# Patient Record
Sex: FEMALE | Race: BLACK OR AFRICAN AMERICAN | Employment: OTHER | URBAN - METROPOLITAN AREA
[De-identification: names, ages, dates, MRNs, and addresses within clinical notes are randomized per-mention and may not be internally consistent; named-entity substitution may affect disease eponyms.]

---

## 2018-05-04 ENCOUNTER — HOSPITAL ENCOUNTER (OUTPATIENT)
Dept: LAB | Age: 68
Discharge: HOME | End: 2018-05-04
Payer: MEDICARE

## 2018-05-04 ENCOUNTER — TELEPHONE (OUTPATIENT)
Dept: FAMILY MEDICINE CLINIC | Age: 68
End: 2018-05-04

## 2018-05-04 ENCOUNTER — OFFICE VISIT (OUTPATIENT)
Dept: FAMILY MEDICINE CLINIC | Age: 68
End: 2018-05-04

## 2018-05-04 VITALS
DIASTOLIC BLOOD PRESSURE: 67 MMHG | BODY MASS INDEX: 50.02 KG/M2 | WEIGHT: 293 LBS | RESPIRATION RATE: 20 BRPM | OXYGEN SATURATION: 98 % | SYSTOLIC BLOOD PRESSURE: 129 MMHG | HEART RATE: 80 BPM | HEIGHT: 64 IN | TEMPERATURE: 97.8 F

## 2018-05-04 DIAGNOSIS — J44.9 COPD, MODERATE (HCC): ICD-10-CM

## 2018-05-04 DIAGNOSIS — I10 ESSENTIAL HYPERTENSION: ICD-10-CM

## 2018-05-04 DIAGNOSIS — J30.89 ALLERGIC RHINITIS DUE TO OTHER ALLERGIC TRIGGER, UNSPECIFIED SEASONALITY: ICD-10-CM

## 2018-05-04 DIAGNOSIS — Z12.39 BREAST CANCER SCREENING: ICD-10-CM

## 2018-05-04 DIAGNOSIS — E66.01 MORBID OBESITY WITH BODY MASS INDEX OF 60.0-69.9 IN ADULT (HCC): ICD-10-CM

## 2018-05-04 DIAGNOSIS — N32.81 OVERACTIVE BLADDER: ICD-10-CM

## 2018-05-04 DIAGNOSIS — R01.1 SYSTOLIC MURMUR: ICD-10-CM

## 2018-05-04 DIAGNOSIS — E78.2 MIXED HYPERLIPIDEMIA: ICD-10-CM

## 2018-05-04 DIAGNOSIS — G47.33 OSA ON CPAP: ICD-10-CM

## 2018-05-04 DIAGNOSIS — E11.9 CONTROLLED TYPE 2 DIABETES MELLITUS WITHOUT COMPLICATION, WITHOUT LONG-TERM CURRENT USE OF INSULIN (HCC): ICD-10-CM

## 2018-05-04 DIAGNOSIS — Z11.59 NEED FOR HEPATITIS C SCREENING TEST: ICD-10-CM

## 2018-05-04 DIAGNOSIS — Z87.19 HISTORY OF RECTAL BLEEDING: Primary | ICD-10-CM

## 2018-05-04 DIAGNOSIS — Z99.89 OSA ON CPAP: ICD-10-CM

## 2018-05-04 DIAGNOSIS — F33.1 MDD (MAJOR DEPRESSIVE DISORDER), RECURRENT EPISODE, MODERATE (HCC): ICD-10-CM

## 2018-05-04 DIAGNOSIS — Z76.89 ESTABLISHING CARE WITH NEW DOCTOR, ENCOUNTER FOR: ICD-10-CM

## 2018-05-04 DIAGNOSIS — N39.46 MIXED INCONTINENCE URGE AND STRESS: ICD-10-CM

## 2018-05-04 PROBLEM — Z86.73 HISTORY OF CVA (CEREBROVASCULAR ACCIDENT): Status: ACTIVE | Noted: 2018-05-04

## 2018-05-04 LAB
ALBUMIN SERPL-MCNC: 3.4 G/DL (ref 3.4–5)
ALBUMIN/GLOB SERPL: 0.9 {RATIO} (ref 0.8–1.7)
ALP SERPL-CCNC: 98 U/L (ref 45–117)
ALT SERPL-CCNC: 15 U/L (ref 13–56)
ANION GAP SERPL CALC-SCNC: 11 MMOL/L (ref 3–18)
AST SERPL-CCNC: 8 U/L (ref 15–37)
BASOPHILS # BLD: 0 K/UL (ref 0–0.06)
BASOPHILS NFR BLD: 0 % (ref 0–2)
BILIRUB SERPL-MCNC: 0.3 MG/DL (ref 0.2–1)
BUN SERPL-MCNC: 27 MG/DL (ref 7–18)
BUN/CREAT SERPL: 26 (ref 12–20)
CALCIUM SERPL-MCNC: 8.4 MG/DL (ref 8.5–10.1)
CHLORIDE SERPL-SCNC: 107 MMOL/L (ref 100–108)
CHOLEST SERPL-MCNC: 171 MG/DL
CO2 SERPL-SCNC: 21 MMOL/L (ref 21–32)
CREAT SERPL-MCNC: 1.05 MG/DL (ref 0.6–1.3)
DIFFERENTIAL METHOD BLD: ABNORMAL
EOSINOPHIL # BLD: 0.3 K/UL (ref 0–0.4)
EOSINOPHIL NFR BLD: 4 % (ref 0–5)
ERYTHROCYTE [DISTWIDTH] IN BLOOD BY AUTOMATED COUNT: 14.3 % (ref 11.6–14.5)
EST. AVERAGE GLUCOSE BLD GHB EST-MCNC: 114 MG/DL
GLOBULIN SER CALC-MCNC: 3.9 G/DL (ref 2–4)
GLUCOSE SERPL-MCNC: 71 MG/DL (ref 74–99)
HBA1C MFR BLD: 5.6 % (ref 4.2–5.6)
HCT VFR BLD AUTO: 39.5 % (ref 35–45)
HDLC SERPL-MCNC: 67 MG/DL (ref 40–60)
HDLC SERPL: 2.6 {RATIO} (ref 0–5)
HGB BLD-MCNC: 12.5 G/DL (ref 12–16)
LDLC SERPL CALC-MCNC: 84.8 MG/DL (ref 0–100)
LIPID PROFILE,FLP: ABNORMAL
LYMPHOCYTES # BLD: 3.2 K/UL (ref 0.9–3.6)
LYMPHOCYTES NFR BLD: 34 % (ref 21–52)
MCH RBC QN AUTO: 31.8 PG (ref 24–34)
MCHC RBC AUTO-ENTMCNC: 31.6 G/DL (ref 31–37)
MCV RBC AUTO: 100.5 FL (ref 74–97)
MONOCYTES # BLD: 0.6 K/UL (ref 0.05–1.2)
MONOCYTES NFR BLD: 6 % (ref 3–10)
NEUTS SEG # BLD: 5.2 K/UL (ref 1.8–8)
NEUTS SEG NFR BLD: 56 % (ref 40–73)
PLATELET # BLD AUTO: 316 K/UL (ref 135–420)
PMV BLD AUTO: 10 FL (ref 9.2–11.8)
POTASSIUM SERPL-SCNC: 4.1 MMOL/L (ref 3.5–5.5)
PROT SERPL-MCNC: 7.3 G/DL (ref 6.4–8.2)
RBC # BLD AUTO: 3.93 M/UL (ref 4.2–5.3)
SODIUM SERPL-SCNC: 139 MMOL/L (ref 136–145)
TRIGL SERPL-MCNC: 96 MG/DL (ref ?–150)
TSH SERPL DL<=0.05 MIU/L-ACNC: 1.14 UIU/ML (ref 0.36–3.74)
VLDLC SERPL CALC-MCNC: 19.2 MG/DL
WBC # BLD AUTO: 9.2 K/UL (ref 4.6–13.2)

## 2018-05-04 PROCEDURE — 86803 HEPATITIS C AB TEST: CPT | Performed by: INTERNAL MEDICINE

## 2018-05-04 PROCEDURE — 80053 COMPREHEN METABOLIC PANEL: CPT | Performed by: INTERNAL MEDICINE

## 2018-05-04 PROCEDURE — 85025 COMPLETE CBC W/AUTO DIFF WBC: CPT | Performed by: INTERNAL MEDICINE

## 2018-05-04 PROCEDURE — 83036 HEMOGLOBIN GLYCOSYLATED A1C: CPT | Performed by: INTERNAL MEDICINE

## 2018-05-04 PROCEDURE — 84443 ASSAY THYROID STIM HORMONE: CPT | Performed by: INTERNAL MEDICINE

## 2018-05-04 PROCEDURE — 80061 LIPID PANEL: CPT | Performed by: INTERNAL MEDICINE

## 2018-05-04 RX ORDER — FLUTICASONE PROPIONATE 50 MCG
2 SPRAY, SUSPENSION (ML) NASAL DAILY
Qty: 1 BOTTLE | Refills: 6 | Status: SHIPPED | OUTPATIENT
Start: 2018-05-04 | End: 2019-04-01 | Stop reason: SDUPTHER

## 2018-05-04 RX ORDER — FLUTICASONE PROPIONATE 50 MCG
2 SPRAY, SUSPENSION (ML) NASAL DAILY
COMMUNITY
End: 2018-05-04 | Stop reason: SDUPTHER

## 2018-05-04 RX ORDER — CITALOPRAM 20 MG/1
20 TABLET, FILM COATED ORAL DAILY
Qty: 30 TAB | Refills: 1 | Status: SHIPPED | OUTPATIENT
Start: 2018-05-04 | End: 2018-06-30 | Stop reason: SDUPTHER

## 2018-05-04 NOTE — PROGRESS NOTES
INTERNAL MEDICINE INITIAL PROVIDER VISIT    SUBJECTIVE:     Chief Complaint   Patient presents with    Establish Care    Diabetes       HPI: 74 y.o. female with PMHx significant for Morbid obesity, depression,  DM, HTN, DARCI/COPD and h/o CVA is here for the above chief complaint(s). Establish Care: Change in coverage 2017. Last PCP Matthew Schwartz, records reviewed. Last blood work 10/2017. Reports she needs refill of celexa and flonase, other medicines not needed currently. Request order for mammogram as she is due for breast cancer screening and referral to GI for colonoscopy, planned 2017 before insurance change for evaluation of rectal bleeding. No longer having rectal bleeding. Sleep Apnea/COPD: Followed by [facility] [place]. YORDAN signed, plans to schedule follow up today. Needs new part for CPAP machines, hasn't used in past few months. Using breo-ellipta and spiriva, occasional as needed albuterol. Taking Singulair for allergies. Diabetes: Taking metformin 500 mg BID. Last A1c per outside records 6.0 2017. Last foot exam 2017. Needs eye referral. Last nephropathy screen  and was negative. Depression: Last dose of celexa last night 20 mg. Does not know if helpful. For past 2 weeks reports nearly daily anhedonia, low energy, feeling down, trouble sleeping, appetite changes. No SI/HI. Writer viewed pill bottle,  in . Will refill celexa, suspect lack of benefit from taking  medications. Will follow. Overactive bladder/mixed urinary incontinence: Using vesicare, helpful. Followed by urology. Hypertension: Today BP is controlled. Taking lisinopril HCTZ 10-12.5 last dose yesterday, plans to take tomorrow. Helps with leg swelling. . Denies headache, lightheadedness, dizziness, chest pain, rapid/irregular heart rate, shortness of breath currently. Some mild leg swelling.         ROS: 12 point ROS completed, positive per HPI and memory troubles, joint pains.     Current Outpatient Prescriptions   Medication Sig    fluticasone (FLONASE ALLERGY RELIEF) 50 mcg/actuation nasal spray 2 Sprays by Both Nostrils route daily.  citalopram (CELEXA) 20 mg tablet Take 1 Tab by mouth daily.  solifenacin (VESICARE) 5 mg tablet Take 1 Tab by mouth daily. Indications: URINARY URGE INCONTINENCE    BREO ELLIPTA 100-25 mcg/dose inhaler     atorvastatin (LIPITOR) 20 mg tablet TAKE ONE TABLET BY MOUTH EVERY NIGHT AT BEDTIME    lisinopril-hydroCHLOROthiazide (PRINZIDE, ZESTORETIC) 10-12.5 mg per tablet TAKE 1 TABLET BY MOUTH EVERY DAY    metFORMIN (GLUCOPHAGE) 500 mg tablet TAKE ONE TABLET BY MOUTH TWO TIMES A DAY    montelukast (SINGULAIR) 10 mg tablet TAKE ONE TABLET BY MOUTH EVERY NIGHT AT BEDTIME    SPIRIVA WITH HANDIHALER 18 mcg inhalation capsule INHALE 1 CAPSULE VIA HANDIHALER ONCE DAILY AT THE SAME TIME EVERY DAY    albuterol (PROVENTIL HFA, VENTOLIN HFA, PROAIR HFA) 90 mcg/actuation inhaler Take  by inhalation.  Menthol-Zinc Oxide (CALMOSEPTINE) 0.44-20.6 % oint Apply  to affected area.  acetaminophen (TYLENOL) 650 mg CR tablet Take 650 mg by mouth every six (6) hours as needed for Pain.  ASPIRIN LOW-STRENGTH PO Take  by mouth. No current facility-administered medications for this visit.       Health Maintenance   Topic Date Due    Hepatitis C Screening  1950    HEMOGLOBIN A1C Q6M  1950    LIPID PANEL Q1  1950    FOOT EXAM Q1  07/05/1962    EYE EXAM RETINAL OR DILATED Q1  07/05/1962    FOBT Q 1 YEAR AGE 50-75  07/05/2002    ZOSTER VACCINE AGE 60>  05/05/2012    BREAST CANCER SCRN MAMMOGRAM  06/02/2016    MICROALBUMIN Q1  06/02/2017    GLAUCOMA SCREENING Q2Y  07/05/2017    Bone Densitometry (Dexa) Screening  07/05/2017    Influenza Age 5 to Adult  08/01/2018    Pneumococcal 65+ Low/Medium Risk (2 of 2 - PPSV23) 10/17/2018    MEDICARE YEARLY EXAM  12/13/2018    DTaP/Tdap/Td series (2 - Td) 03/06/2024       Medications and Allergies: Reviewed and confirmed in the chart    Past Medical Hx: Reviewed and confirmed in the chart  Patient Active Problem List   Diagnosis Code    Low back pain M54.5    Knee pain M25.569    DJD (degenerative joint disease) M19.90    Mixed incontinence urge and stress N39.46    Allergic rhinitis J30.9    Controlled type 2 diabetes mellitus without complication, without long-term current use of insulin (HCC) E11.9    COPD, moderate ([address] J44.9    DDD (degenerative disc disease) GOD1223    Essential hypertension, benign I10    Family history of breast cancer in sister Z80.2   [facility] L30.4    Mixed hyperlipidemia E78.2    Morbid obesity with body mass index of 60.0-69.9 in adult ([facility] E66.01, Z68.44    DARCI on CPAP G47.33, Z99.89    Stasis, venous I87.8    Tear of meniscus of left knee S83.207A    Tobacco abuse Z72.0    Overactive bladder I61.60    Systolic murmur A34.0    History of CVA (cerebrovascular accident) Z80.78       Family Hx, Surgical Hx, Social Hx: Reviewed and updated in EMR    OBJECTIVE:  Vitals:    05/04/18 1117   BP: 129/67   Pulse: 80   Resp: 20   Temp: 97.8 °F (36.6 °C)   TempSrc: Oral   SpO2: 98%   Weight: (!) 377 lb (171 kg)   Height: 5' 4\" (1.626 m)       BP Readings from Last 3 Encounters:   05/04/18 129/67   07/05/17 110/80   03/23/17 130/90     Wt Readings from Last 3 Encounters:   05/04/18 (!) 377 lb (171 kg)   07/05/17 (!) 383 lb (173.7 kg)   03/23/17 (!) 383 lb (173.7 kg)       General: Pleasant elderly morbidly obese woman sitting comfortably in no acute distress  HEENT: Head is atraumatic normo-cephalic. CVS: Heart regular, rate, and rhythm. Audible S1 and S2. KELSEY heart at base of heart. PULM: Lungs clear to auscultation bilaterally. No wheezes, rales or rhonchi. GI: Positive bowel sounds, soft, nondistended, non-tender. EXT: 2+ dorsalis pedis pulses bilaterally. Trace pitting edema bilateral LE. Neuro: Alert and oriented to person, situation. MSE: Mood euthymic with underlying dysphoria, affect congruent    PHQ-9: 16, not difficult at all  STEVE-7: 4, not difficult at all    Nursing Notes Reviewed    ASSESSMENT AND PLAN    ICD-10-CM ICD-9-CM    1. History of rectal bleeding Z87.19 V12.79 REFERRAL TO GASTROENTEROLOGY      COLLECTION VENOUS BLOOD,VENIPUNCTURE   2. MDD (major depressive disorder), recurrent episode, moderate ([facility]): low risk SI F33.1 296.32 citalopram (CELEXA) 20 mg tablet   3. Controlled type 2 diabetes mellitus without complication, without long-term current use of insulin ([facility]) E11.9 250.00 Continue current mgmt  MICROALBUMIN, UR, RAND W/ MICROALB/CREAT RATIO      HEMOGLOBIN A1C WITH EAG      METABOLIC PANEL, COMPREHENSIVE      CBC WITH AUTOMATED DIFF      REFERRAL TO OPHTHALMOLOGY      COLLECTION VENOUS BLOOD,VENIPUNCTURE  RTC in 4 weeks foot exam   4. Morbid obesity with body mass index of 60.0-69.9 in adult ([facility]) E66.01 278.01 TSH 3RD GENERATION    Z68.44 V85.44    5. COPD, moderate ([facility] J44.9 496 Continue current mgmt   6. DARCI on CPAP G47.33 327.23 Continue current mgmt  F/u with sleep medicine  Will let us know if she needs a new referral    Z99.89 V46.8    7. Establishing care with new doctor, encounter for Z76.89 V65.8    8. Essential hypertension I10 401.9 Continue current mgmt   9. Mixed incontinence urge and stress N39.46 788.33 Continue current mgmt  F/u with urology   10. Overactive bladder N32.81 596.51 Continue current mgmt  F/u with urology   11. Allergic rhinitis due to other allergic trigger, unspecified seasonality J30.89 477.8 fluticasone (FLONASE ALLERGY RELIEF) 50 mcg/actuation nasal spray   12. Need for hepatitis C screening test Z11.59 V73.89 HEPATITIS C AB   13. Mixed hyperlipidemia E78.2 272.2 LIPID PANEL  Continue current mgmt   14. Breast cancer screening Z12.31 V76.10 JENNIFFER MAMMO BI SCREENING INCL CAD      CBC WITH AUTOMATED DIFF   15. Systolic murmur A78.5 378. 2 Discuss next visit       Orders Placed This Encounter    JENNIFFER MAMMO BI SCREENING INCL CAD    HEPATITIS C AB    MICROALBUMIN, UR, RAND W/ MICROALB/CREAT RATIO    LIPID PANEL    HEMOGLOBIN A1C WITH EAG    METABOLIC PANEL, COMPREHENSIVE    CBC WITH AUTOMATED DIFF    TSH 3RD GENERATION    REFERRAL TO GASTROENTEROLOGY    REFERRAL TO OPHTHALMOLOGY    COLLECTION VENOUS BLOOD,VENIPUNCTURE    DISCONTD: fluticasone (FLONASE ALLERGY RELIEF) 50 mcg/actuation nasal spray    fluticasone (FLONASE ALLERGY RELIEF) 50 mcg/actuation nasal spray    citalopram (CELEXA) 20 mg tablet       Future Appointments  Date Time Provider Department Center   6/1/2018 1:00 PM Caitlin Saldana MD [address] [place] [address] printed and provided to patient    Assessment and plan above discussed with patient, patient voiced understanding and agreement with plan. More than 50% of this 45 min visit was spent face to face counseling the patient about the etiology and treatment options for the above health conditions outlined in assessment and plan       Caitlin Saldana M.D.   [facility]  [address] [facility], [address] [place] [address]   [place] [phone]

## 2018-05-04 NOTE — MR AVS SNAPSHOT
[address] [address] 
[phone] Patient: Melonie Gaston MRN: [id number] INR:8/9/3395 Visit Information Date & Time Provider Department Dept. Phone Encounter #  
 5/4/2018 11:00 AM Vamshi Rangel MD [address] [phone] [id number] Follow-up Instructions Return in about 4 weeks (around 6/1/2018), or if symptoms worsen or fail to improve, for depression, diabetes. Upcoming Health Maintenance Date Due Hepatitis C Screening 1950 HEMOGLOBIN A1C Q6M 1950 LIPID PANEL Q1 1950 FOOT EXAM Q1 7/5/1962 EYE EXAM RETINAL OR DILATED Q1 7/5/1962 FOBT Q 1 YEAR AGE 50-75 7/5/2002 ZOSTER VACCINE AGE 60> 5/5/2012 BREAST CANCER SCRN MAMMOGRAM 6/2/2016 MICROALBUMIN Q1 6/2/2017 GLAUCOMA SCREENING Q2Y 7/5/2017 Bone Densitometry (Dexa) Screening 7/5/2017 Influenza Age 5 to Adult 8/1/2018 Pneumococcal 65+ Low/Medium Risk (2 of 2 - PPSV23) 10/17/2018 MEDICARE YEARLY EXAM 12/13/2018 DTaP/Tdap/Td series (2 - Td) 3/6/2024 Allergies as of 5/4/2018  Review Complete On: 5/4/2018 By: Vamshi Rangel MD  
  
 Severity Noted Reaction Type Reactions Penicillin G  03/23/2017    Unknown (comments) Current Immunizations  Never Reviewed Name Date Influenza Vaccine 10/17/2017 12:00 AM, 10/3/2016 12:00 AM, 12/20/2012 12:00 AM, 12/2/2011 12:00 AM, 10/4/2010 12:00 AM  
 Novel Influenza-H1N1-09, All Formulations 1/19/2010 12:00 AM  
 Pneumococcal Conjugate (PCV-13) 10/17/2017 12:00 AM  
 Pneumococcal Polysaccharide (PPSV-23) 9/14/2012 12:00 AM  
 Tdap 3/6/2014 12:00 AM  
  
 Not reviewed this visit You Were Diagnosed With   
  
 Codes Comments History of rectal bleeding    -  Primary ICD-10-CM: Z87.19 ICD-9-CM: V12.79  Recurrent major depressive disorder, in partial remission ([facility]     ICD-10-CM: F33.41 
ICD-9-CM: 296.35   
 Controlled type 2 diabetes mellitus without complication, without long-term current use of insulin ([facility]     ICD-10-CM: E11.9 ICD-9-CM: 250.00 Morbid obesity with body mass index of 60.0-69.9 in adult [facility])     ICD-10-CM: E66.01, Z68.44 
ICD-9-CM: 278.01, V85.44   
 COPD, moderate ([facility]     ICD-10-CM: J44.9 ICD-9-CM: 779 DARCI on CPAP     ICD-10-CM: G47.33, Z99.89 ICD-9-CM: 327.23, V46.8 Establishing care with new doctor, encounter for     ICD-10-CM: Z76.89 
ICD-9-CM: V65.8 Essential hypertension     ICD-10-CM: I10 
ICD-9-CM: 401.9 Mixed incontinence urge and stress     ICD-10-CM: N39.46 
ICD-9-CM: 788.33 Overactive bladder     ICD-10-CM: N32.81 ICD-9-CM: 596.51 Allergic rhinitis due to other allergic trigger, unspecified seasonality     ICD-10-CM: J30.89 ICD-9-CM: 477.8 Need for hepatitis C screening test     ICD-10-CM: Z11.59 
ICD-9-CM: V73.89 Mixed hyperlipidemia     ICD-10-CM: E78.2 ICD-9-CM: 272.2 Family history of breast cancer in sister     ICD-10-CM: Z80.3 ICD-9-CM: V16.3 Breast cancer screening     ICD-10-CM: Z12.31 
ICD-9-CM: V76.10 Systolic murmur     GBN-79-JT: R01.1 ICD-9-CM: 647. 2 Vitals BP Pulse Temp Resp Height(growth percentile) Weight(growth percentile) 129/67 80 97.8 °F (36.6 °C) (Oral) 20 5' 4\" (1.626 m) (!) 377 lb (171 kg) SpO2 BMI OB Status Smoking Status 98% 64.71 kg/m2 Postmenopausal Current Every Day Smoker Vitals History BMI and BSA Data Body Mass Index Body Surface Area 64.71 kg/m 2 2.78 m 2 Preferred Pharmacy Pharmacy Name Phone [facility]/PHARMACY #[id number] [place], [address] [facility] [phone] Your Updated Medication List  
  
   
This list is accurate as of 5/4/18 11:51 AM.  Always use your most recent med list.  
  
  
  
  
 acetaminophen 650 mg Tber Commonly known as:  TYLENOL Take 650 mg by mouth every six (6) hours as needed for Pain. albuterol 90 mcg/actuation inhaler Commonly known as:  PROVENTIL HFA, VENTOLIN HFA, PROAIR HFA Take  by inhalation. ASPIRIN LOW-STRENGTH PO Take  by mouth. atorvastatin 20 mg tablet Commonly known as:  LIPITOR  
TAKE ONE TABLET BY MOUTH EVERY NIGHT AT BEDTIME  
  
 BREO ELLIPTA 100-25 mcg/dose inhaler Generic drug:  fluticasone-vilanterol  
  
 citalopram 20 mg tablet Commonly known as:  Dinesh Copa Take 1 Tab by mouth daily. fluticasone 50 mcg/actuation nasal spray Commonly known as:  FLONASE ALLERGY RELIEF  
2 Sprays by Both Nostrils route daily. lisinopril-hydroCHLOROthiazide 10-12.5 mg per tablet Commonly known as:  PRINZIDE, ZESTORETIC  
TAKE 1 TABLET BY MOUTH EVERY DAY Menthol-Zinc Oxide 0.44-20.6 % Oint Commonly known as:  Calmoseptine Apply  to affected area. metFORMIN 500 mg tablet Commonly known as:  GLUCOPHAGE  
TAKE ONE TABLET BY MOUTH TWO TIMES A DAY  
  
 montelukast 10 mg tablet Commonly known as:  SINGULAIR  
TAKE ONE TABLET BY MOUTH EVERY NIGHT AT BEDTIME  
  
 solifenacin 5 mg tablet Commonly known as:  Renetta Lesch Take 1 Tab by mouth daily. Indications: URINARY URGE INCONTINENCE  
  
 SPIRIVA WITH HANDIHALER 18 mcg inhalation capsule Generic drug:  tiotropium INHALE 1 CAPSULE VIA HANDIHALER ONCE DAILY AT THE SAME TIME EVERY DAY Prescriptions Sent to Pharmacy Refills  
 fluticasone (FLONASE ALLERGY RELIEF) 50 mcg/actuation nasal spray 6 Si Sprays by Both Nostrils route daily. Class: Normal  
 Pharmacy: [facility]/pharmacy #[id number] [address] Ph #: [phone] Route: Both Nostrils  
 citalopram (CELEXA) 20 mg tablet 1 Sig: Take 1 Tab by mouth daily. Class: Normal  
 Pharmacy: [facility]/pharmacy #[id number] [address] Ph #: [phone] Route: Oral  
  
We Performed the Following REFERRAL TO GASTROENTEROLOGY [DDP89 Custom] Comments: Please evaluate patient for rectal bleeding, was about to get colonoscopy but insurance changed [place] preference woman preference REFERRAL TO OPHTHALMOLOGY [REF57 Custom] Comments:  
 Please evaluate patient for diabetic retinopathy and glaucoma 
 
[place] pref. Follow-up Instructions Return in about 4 weeks (around 6/1/2018), or if symptoms worsen or fail to improve, for depression, diabetes. To-Do List   
 05/04/2018 Lab:  CBC WITH AUTOMATED DIFF   
  
 05/04/2018 Lab:  HEMOGLOBIN A1C WITH EAG   
  
 05/04/2018 Lab:  HEPATITIS C AB   
  
 05/04/2018 Lab:  LIPID PANEL   
  
 05/04/2018 Imaging:  JENNIFFER MAMMO BI SCREENING INCL CAD   
  
 05/04/2018 Lab:  METABOLIC PANEL, COMPREHENSIVE   
  
 05/04/2018 Lab:  MICROALBUMIN, UR, RAND W/ MICROALB/CREAT RATIO   
  
 05/04/2018 Lab:  TSH 3RD GENERATION Referral Information Referral ID Referred By Referred To  
  
 [id number] [place] [facility] Not Available Visits Status Start Date End Date 1 New Request 5/4/18 5/4/19 If your referral has a status of pending review or denied, additional information will be sent to support the outcome of this decision. Referral ID Referred By [facility]  
 [id number] Status Start Date End Date 1 New Request 5/4/18 5/4/19 If your referral has a status of pending review or denied, additional information will be sent to support the outcome of this decision. Patient Instructions We are sending a referral to gastroenterology for rectal bleeding/colonoscopy , to eye doctor for screening, to [facility] for mammogram breast cancer screening. You should be called about this in 1-2 weeks. If no word in 2 weeks please give us a call to follow up. We will let you know the results of your blood and urine test when they come in.  We will call if abnormal and send a letter if normal. 
 
 GO THROUGH ALL MEDICINES AT HOME AND LET US KNOW WHICH REFILLS YOU NEED AT LEAST 1 WEEK BEFORE YOU RUN OUT 
 
RETURN IN 4 WEEKS FOR DEPRESSION AND DIABETES FOLLOW UP 
 
 
CALL TO SCHEDULE FOLLOW UP WITH SLEEP MEDICINE AND GO GET PART FOR CPAP MACHINE, CALL YOUR LUNG/SLEEP SPECIALIST DOCTOR IF YOU NEED AN ORDER FOR A NEW PART. SLEEP UP-RIGHT UNTIL YOU GET YOUR CPAP MACHINE FIXED 
 
NO DROWSY DRIVING Introducing [facility]! [employer] introduces [employer] patient portal. Now you can access parts of your medical record, email your doctor's office, and request medication refills online. 1. In your internet browser, go to [email or web address] 2. Click on the First Time User? Click Here link in the Sign In box. You will see the New Member Sign Up page. 3. Enter your [employer] Access Code exactly as it appears below. You will not need to use this code after youve completed the sign-up process. If you do not sign up before the expiration date, you must request a new code. · [employer] Access Code: [email or web address] Expires: 8/2/2018 11:51 AM 
 
4. Enter the last four digits of your Social Security Number (xxxx) and Date of Birth (mm/dd/yyyy) as indicated and click Submit. You will be taken to the next sign-up page. 5. Create a [employer] ID. This will be your [employer] login ID and cannot be changed, so think of one that is secure and easy to remember. 6. Create a [employer] password. You can change your password at any time. 7. Enter your Password Reset Question and Answer. This can be used at a later time if you forget your password. 8. Enter your e-mail address. You will receive e-mail notification when new information is available in [address] 9. Click Sign Up. You can now view and download portions of your medical record. 10. Click the Download Summary menu link to download a portable copy of your medical information. If you have questions, please visit the Frequently Asked Questions section of the [employer] website. Remember, [employer] is NOT to be used for urgent needs. For medical emergencies, dial 911. Now available from your iPhone and Android! Please provide this summary of care documentation to your next provider. Your primary care clinician is listed as Dino Swanson. If you have any questions after today's visit, please call [phone].

## 2018-05-04 NOTE — TELEPHONE ENCOUNTER
Patient was not able to leave urine sample. All other labs drawn. 1 lav and 2sst. Will be sent to [employer].

## 2018-05-04 NOTE — PROGRESS NOTES
Chief Complaint   Patient presents with    Establish Care    Diabetes       1. Have you been to the ER, urgent care clinic since your last visit? Hospitalized since your last visit? No    2. Have you seen or consulted any other health care providers outside of the [address] [place] [address] since your last visit? Include any pap smears or colon screening.  No

## 2018-05-04 NOTE — PATIENT INSTRUCTIONS
We are sending a referral to gastroenterology for rectal bleeding/colonoscopy , to eye doctor for screening, to [facility] for mammogram breast cancer screening. You should be called about this in 1-2 weeks. If no word in 2 weeks please give us a call to follow up. We will let you know the results of your blood and urine test when they come in. We will call if abnormal and send a letter if normal.    GO THROUGH ALL MEDICINES AT [address] [facility] REFILLS YOU NEED AT LEAST 1 WEEK BEFORE YOU RUN OUT    RETURN IN 4 WEEKS FOR DEPRESSION AND DIABETES FOLLOW UP      CALL TO SCHEDULE FOLLOW UP WITH SLEEP MEDICINE AND GO GET PART FOR CPAP MACHINE, CALL YOUR LUNG/SLEEP SPECIALIST DOCTOR IF YOU NEED AN ORDER FOR A NEW PART.     SLEEP UP-RIGHT UNTIL YOU GET YOUR CPAP MACHINE FIXED    NO DROWSY DRIVING

## 2018-05-07 LAB
HCV AB SER IA-ACNC: 0.03 INDEX
HCV AB SERPL QL IA: NEGATIVE
HCV COMMENT,HCGAC: NORMAL

## 2018-05-10 ENCOUNTER — TELEPHONE (OUTPATIENT)
Dept: FAMILY MEDICINE CLINIC | Age: 68
End: 2018-05-10

## 2018-05-11 ENCOUNTER — TELEPHONE (OUTPATIENT)
Dept: FAMILY MEDICINE CLINIC | Age: 68
End: 2018-05-11

## 2018-05-11 NOTE — TELEPHONE ENCOUNTER
Pt states was told by [facility] not accepted.  States please find another facility in [place] area

## 2018-05-14 NOTE — TELEPHONE ENCOUNTER
[facility]:    [facility]  [address], [address] [place] [address] Road  Phone: [phone]  Fax:  [phone]

## 2018-05-15 NOTE — TELEPHONE ENCOUNTER
Referral coordinator \[facility] [place] [facility]\"" spoke with pt regarding appt for rectal bleeding. States was told by pt no longer having issues. Pt requesting a colonoscopy States was also told she was seen by another gastroenterologist 3 months ago. [facility] was not sure of provider name possibly Dr Chevy Hooper. States when a pt wants to go to another gastroenterology it is considered a second. Pt states pt requesting to go to a female provider.  Per referral coordinator [facility] has female providers

## 2018-05-17 ENCOUNTER — TELEPHONE (OUTPATIENT)
Dept: FAMILY MEDICINE CLINIC | Age: 68
End: 2018-05-17

## 2018-05-23 PROBLEM — H35.033 HYPERTENSIVE RETINOPATHY OF BOTH EYES: Status: ACTIVE | Noted: 2018-05-23

## 2018-05-23 PROBLEM — H26.9 CATARACTS, BILATERAL: Status: ACTIVE | Noted: 2018-05-23

## 2018-06-11 RX ORDER — METFORMIN HYDROCHLORIDE 500 MG/1
500 TABLET ORAL 2 TIMES DAILY WITH MEALS
Qty: 90 TAB | Refills: 3 | Status: SHIPPED | OUTPATIENT
Start: 2018-06-11 | End: 2018-09-28 | Stop reason: SDUPTHER

## 2018-06-11 RX ORDER — ISOPROPYL ALCOHOL 70 ML/100ML
SWAB TOPICAL
Qty: 100 PAD | Refills: 0 | Status: SHIPPED | OUTPATIENT
Start: 2018-06-11 | End: 2019-04-01 | Stop reason: SDUPTHER

## 2018-06-15 ENCOUNTER — OFFICE VISIT (OUTPATIENT)
Dept: FAMILY MEDICINE CLINIC | Age: 68
End: 2018-06-15

## 2018-06-15 VITALS
SYSTOLIC BLOOD PRESSURE: 132 MMHG | TEMPERATURE: 97.8 F | HEIGHT: 64 IN | RESPIRATION RATE: 18 BRPM | HEART RATE: 82 BPM | BODY MASS INDEX: 50.02 KG/M2 | OXYGEN SATURATION: 97 % | DIASTOLIC BLOOD PRESSURE: 77 MMHG | WEIGHT: 293 LBS

## 2018-06-15 DIAGNOSIS — E86.0 DEHYDRATION: ICD-10-CM

## 2018-06-15 DIAGNOSIS — F33.1 MDD (MAJOR DEPRESSIVE DISORDER), RECURRENT EPISODE, MODERATE (HCC): ICD-10-CM

## 2018-06-15 DIAGNOSIS — F17.210 CIGARETTE NICOTINE DEPENDENCE WITHOUT COMPLICATION: ICD-10-CM

## 2018-06-15 DIAGNOSIS — B35.3 TINEA PEDIS OF BOTH FEET: ICD-10-CM

## 2018-06-15 DIAGNOSIS — R01.1 SYSTOLIC MURMUR: ICD-10-CM

## 2018-06-15 DIAGNOSIS — E66.01 MORBID OBESITY WITH BODY MASS INDEX OF 60.0-69.9 IN ADULT (HCC): ICD-10-CM

## 2018-06-15 DIAGNOSIS — Z12.11 COLON CANCER SCREENING: ICD-10-CM

## 2018-06-15 DIAGNOSIS — B35.1 ONYCHOMYCOSIS: ICD-10-CM

## 2018-06-15 DIAGNOSIS — E11.9 CONTROLLED TYPE 2 DIABETES MELLITUS WITHOUT COMPLICATION, WITHOUT LONG-TERM CURRENT USE OF INSULIN (HCC): Primary | ICD-10-CM

## 2018-06-15 RX ORDER — LIDOCAINE 50 MG/G
OINTMENT TOPICAL
COMMUNITY
Start: 2018-06-04 | End: 2018-06-15

## 2018-06-15 RX ORDER — KETOCONAZOLE 20 MG/G
CREAM TOPICAL 2 TIMES DAILY
Qty: 15 G | Refills: 1 | Status: SHIPPED | OUTPATIENT
Start: 2018-06-15 | End: 2019-04-01 | Stop reason: SDUPTHER

## 2018-06-15 RX ORDER — BUPROPION HYDROCHLORIDE 150 MG/1
TABLET, EXTENDED RELEASE ORAL
Qty: 60 TAB | Refills: 2 | Status: SHIPPED | OUTPATIENT
Start: 2018-06-15 | End: 2018-08-02 | Stop reason: SDUPTHER

## 2018-06-15 NOTE — PROGRESS NOTES
Internal Medicine Follow Up Visit Note    Chief Complaint   Patient presents with    Depression    Diabetes    Dizziness       HPI:  Montez Robledo is a 74 y.o. female with PMHx significant for Morbid obesity, depression,  DM, HTN, DARCI/COPD and h/o CVA is here for the above chief complaint(s). Last seen 2018 for establish care. Diabetes F/U: Diagnosed . Medications include metformin 500 mg BID. Medication compliance good. Working on low sugar diet. Exercise includes nothing currently. Denies nausea, vomiting, diarrhea, abdominal pain, unintentional weight loss, increased thirst, increased urination, recent skin infections or other infections. Due for foot exam today. Depression: Since last visit mood is worse since last visit, emotionally feels \"pretty good. \" Taking celexa 20 mg every day, no SE. Unsure if helpful. For past 2 weeks reports nearly daily anhedonia, low energy, trouble sleeping, more than half days of feeling down depressed and several days of worrying too much, irritable mood, feeling afraid, poor appetite and feeling bad about self. No SI/HI/AVH/PD/Domitila. Dizziness: Onset yesterday after walking for a while, associated LH. Water intake that day none and came in from heat. Dehydration on labs 2018: Reports drinking minimal water intake. Diabetes: Taking metformin 500 mg BID. Last A1c <6. Foot exam due now. Nephropathy screen due now. Depression: Last dose of celexa last night 20 mg. Does not know if helpful. For past 2 weeks reports nearly daily anhedonia, low energy, feeling down, trouble sleeping, appetite changes. No SI/HI. Writer viewed pill bottle,  in . Will refill celexa, suspect lack of benefit from taking  medications. Will follow. Systolic Murmur: [place] on examinataion last visit. No chest pain at rest or with walking. Has sharp pain with increased smoking. No h/o seizures.  LENNON after 50 feet for past 3-4 months prior to this could walk further without LENNON. No PND or orthopnea. Some swelling in legs for years, rarely. No calf pain with walking. No past diagnosis of heart failure. Has history COPD on inhalers and DARCI using CPAP machine once last week improved sleep. Smoking Cessation: Smoking <1 ppd.  and other housemate smokes in house. Reports ready to quit. Tried chantix in past, was helping but insurance stopped covering medications. ROS: as per HPI    Current Outpatient Prescriptions   Medication Sig    ketoconazole (NIZORAL) 2 % topical cream Apply  to affected area two (2) times a day. INBETWEEN TOES AND BOTTOM OF BOTH FEET    buPROPion SR (WELLBUTRIN SR) 150 mg SR tablet Take 1 tab PO q AM for 3 days then take 1 tab PO BID, last dose before 2PM    metFORMIN (GLUCOPHAGE) 500 mg tablet Take 1 Tab by mouth two (2) times daily (with meals).  alcohol swabs (ALCOHOL PADS) padm Use as needed    fluticasone (FLONASE ALLERGY RELIEF) 50 mcg/actuation nasal spray 2 Sprays by Both Nostrils route daily.  citalopram (CELEXA) 20 mg tablet Take 1 Tab by mouth daily.  solifenacin (VESICARE) 5 mg tablet Take 1 Tab by mouth daily. Indications: URINARY URGE INCONTINENCE    BREO ELLIPTA 100-25 mcg/dose inhaler     atorvastatin (LIPITOR) 20 mg tablet TAKE ONE TABLET BY MOUTH EVERY NIGHT AT BEDTIME    lisinopril-hydroCHLOROthiazide (PRINZIDE, ZESTORETIC) 10-12.5 mg per tablet TAKE 1 TABLET BY MOUTH EVERY DAY    metFORMIN (GLUCOPHAGE) 500 mg tablet TAKE ONE TABLET BY MOUTH TWO TIMES A DAY    montelukast (SINGULAIR) 10 mg tablet TAKE ONE TABLET BY MOUTH EVERY NIGHT AT BEDTIME    SPIRIVA WITH HANDIHALER 18 mcg inhalation capsule INHALE 1 CAPSULE VIA HANDIHALER ONCE DAILY AT THE SAME TIME EVERY DAY    albuterol (PROVENTIL HFA, VENTOLIN HFA, PROAIR HFA) 90 mcg/actuation inhaler Take  by inhalation.  Menthol-Zinc Oxide (CALMOSEPTINE) 0.44-20.6 % oint Apply  to affected area.     acetaminophen (TYLENOL) 650 mg CR tablet Take 650 mg by mouth every six (6) hours as needed for Pain.  ASPIRIN LOW-STRENGTH PO Take  by mouth. No current facility-administered medications for this visit. Health Maintenance   Topic Date Due    FOBT Q 1 YEAR AGE 50-75  07/05/2002    ZOSTER VACCINE AGE 60>  05/05/2012    MICROALBUMIN Q1  06/02/2017    Bone Densitometry (Dexa) Screening  07/05/2017    Influenza Age 9 to Adult  08/01/2018    Pneumococcal 65+ Low/Medium Risk (2 of 2 - PPSV23) 10/17/2018    HEMOGLOBIN A1C Q6M  11/04/2018    LIPID PANEL Q1  05/04/2019    EYE EXAM RETINAL OR DILATED Q1  05/18/2019    FOOT EXAM Q1  06/15/2019    BREAST CANCER SCRN MAMMOGRAM  05/10/2020    GLAUCOMA SCREENING Q2Y  05/18/2020    DTaP/Tdap/Td series (2 - Td) 03/06/2024    Hepatitis C Screening  Completed     Immunization History   Administered Date(s) Administered    Influenza Vaccine 10/04/2010, 12/02/2011, 12/20/2012, 10/03/2016, 10/17/2017    Novel Influenza-H1N1-09, All Formulations 01/19/2010    Pneumococcal Conjugate (PCV-13) 10/17/2017    Pneumococcal Polysaccharide (PPSV-23) 09/14/2012    Tdap 03/06/2014       Allergies and Medications: Reviewed and updated in EMR. Past Medical History:   Diagnosis Date    Back pain     CVA (cerebral vascular accident) [facility]) 2015    Establishing care with new doctor, encounter for 04/20/2015   [facility] discharge follow-up 04/20/2015    Hypercholesterolemia with hypertriglyceridemia 12/02/2011    Hypertension     Impaired fasting glucose 04/20/2015    Knee pain     Osteoarthritis     Urge incontinence 04/13/2016       Family History, Social History, Past Medical History, Surgical History: Reviewed and updated in EMR as appropriate.       OBJECTIVE:   Visit Vitals    /77    Pulse 82    Temp 97.8 °F (36.6 °C) (Oral)    Resp 18    Ht 5' 4\" (1.626 m)    Wt (!) 374 lb (169.6 kg)    SpO2 97%    BMI 64.2 kg/m2        BP Readings from Last 3 Encounters:   06/15/18 132/77 05/04/18 129/67   07/05/17 110/80     Wt Readings from Last 3 Encounters:   06/15/18 (!) 374 lb (169.6 kg)   05/04/18 (!) 377 lb (171 kg)   07/05/17 (!) 383 lb (173.7 kg)       General: Pleasant morbidly obese adult woman sitting comfortably in no acute distress  HEENT: Head is atraumatic normo-cephalic. CVS: Heart regular, rate, and rhythm. Audible S1 and S2. KELSEY. PULM: Lungs clear to auscultation bilaterally. No wheezes, rales or rhonchi. EXT: 2+ dorsalis pedis pulses bilaterally. No pedal edema bilaterally  Neuro: Alert and oriented x3  MSE: mood euthymic with underlying dysphoria, affect congruent and reactive. No SI/HI    Diabetic Foot exam: 2+ dorsalis pedis pulses bilaterally. Positive monofilament in all 10 toes and bottoms of both feet. Vibratory sense intact bilateral MTP joints. Joint poisition sense intact bilateral first digits. Skin negative for ulcerative lesions. Positive tinea pedis bilateral feet in between toes. Positive onychomycosis. PHQ-9: 13, somewhat difficult  STEVE-7: 4, somewhat difficult    Nursing Notes Reviewed.     LABS/RADIOLOGICAL TESTS:      Lab Results   Component Value Date/Time    WBC 9.2 05/04/2018 11:53 AM    HGB 12.5 05/04/2018 11:53 AM    HCT 39.5 05/04/2018 11:53 AM    PLATELET 549 02/02/5012 11:53 AM     Lab Results   Component Value Date/Time    Sodium 139 05/04/2018 11:53 AM    Potassium 4.1 05/04/2018 11:53 AM    Chloride 107 05/04/2018 11:53 AM    CO2 21 05/04/2018 11:53 AM    Glucose 71 (L) 05/04/2018 11:53 AM    BUN 27 (H) 05/04/2018 11:53 AM    Creatinine 1.05 05/04/2018 11:53 AM     Lab Results   Component Value Date/Time    Cholesterol, total 171 05/04/2018 11:53 AM    HDL Cholesterol 67 (H) 05/04/2018 11:53 AM    LDL, calculated 84.8 05/04/2018 11:53 AM    Triglyceride 96 05/04/2018 11:53 AM   2/19/2016 11:52 AM - Panfilo, Card Result In   Impression                                                                   Study ID: [id number]                                                     [address], [address]                              Adult Echocardiogram Report    Name: Jayna Meadows  Study Date: 04/15/2015 11:16 AM  MRN: [id number]                    Patient Location: [id number]  : 1950                Age: 64 yrs  Height: 64 in                  Weight: 390 lb                                                                     BSA: 2.6 m2  Gender: Female                 Account #: [de-identified]  Reason For Study: STROKE  Ordering Physician: Laurie Mitchell  Performed By: Rolando Noble    Interpretation Summary  A complete two-dimensional transthoracic echocardiogram was performed (2D, M-  mode, Doppler and color flow Doppler). The study was technically difficult. Normal left ventricular size and systolic function with an estimated ejection  fraction of 65%. Left atrial enlargement. Estimated right ventricular systolic pressure is 39 mmHg. No prior study documented for comparison. 2018  9:09 PM - Panfilo, Lab In [facility]   Component Results   Component Value Flag Ref Range Units Status   Hemoglobin A1c 5.6  4.2 - 5.6 % Final       ECG: QTc 415, sinus rhythm. No pathological Q waves. Unable to compare to prior. ST inversion lead III. Per my read. All lab results and radiological studies were reviewed and discussed with the patient. ASSESSMENT/PLAN:      ICD-10-CM ICD-9-CM    1. Controlled type 2 diabetes mellitus without complication, without long-term current use of insulin (HCC) E11.9 250.00 MICROALBUMIN, UR, RAND W/ MICROALB/CREAT RATIO   2. Morbid obesity with body mass index of 60.0-69.9 in adult ([facility] [address] E66.01 278.01 Continue to work on diet and exercise  BMI handout given    Z68.44 V85.44    3.  Systolic murmur A98.8 126.2 2D ECHO COMPLETE ADULT (TTE) W OR WO CONTR      AMB POC EKG ROUTINE W/ 12 LEADS, INTER & REP   4. Colon cancer screening Z12.11 V76.51 OCCULT BLOOD, IMMUNOASSAY (FIT)   5. Tinea pedis of both feet B35.3 110.4 ketoconazole (NIZORAL) 2 % topical cream   6. Onychomycosis B35.1 110.1    7. Cigarette nicotine dependence without complication X22.007 234.2 buPROPion SR (WELLBUTRIN SR) 150 mg SR tablet   8. MDD (major depressive disorder), recurrent episode, moderate (HCC) F33.1 296.32 Continue current mgmt  Use CPAP nightly  Increase exercise   9. Dehydration: increase water intake    Requested Prescriptions     Signed Prescriptions Disp Refills    ketoconazole (NIZORAL) 2 % topical cream 15 g 1     Sig: Apply  to affected area two (2) times a day. INBETWEEN TOES AND BOTTOM OF BOTH FEET    buPROPion SR (WELLBUTRIN SR) 150 mg SR tablet 60 Tab 2     Sig: Take 1 tab PO q AM for 3 days then take 1 tab PO BID, last dose before 2PM     Patient verbalized understanding and agreement with the plan. Patient was given an after-visit summary. Follow-up Disposition:  Return in about 6 weeks (around 7/27/2018), or if symptoms worsen or fail to improve, for MOOD, SMOKING. or sooner if worsening symptoms. More than 50% of this 40 min visit was spent counseling the patient face to face about etiology and treatment of health conditions outlined in assessment and plan. Brynn Ferrera M.D.   [address], [address] [place] [address], [address] [place], [place] [id number]  [place] [phone]

## 2018-06-15 NOTE — PROGRESS NOTES
Patient is here for 4 week f/u for depression and diabetes. 1. Have you been to the ER, urgent care clinic since your last visit? Hospitalized since your last visit?no    2. Have you seen or consulted any other health care providers outside of the [address] [place] [address] since your last visit? Include any pap smears or colon screening.  no

## 2018-06-15 NOTE — PATIENT INSTRUCTIONS
SMOKING NEAR OXYGEN TANKS CAN LEAD TO DEADLY EXPLOSIONS     START USING CPAP MACHINE EVERY NIGHT FOR AT LEAST 4 HOURS  DRINK AT LEAST 1.5-2 LITERS (50-68 OUNCES) OF WATER PER DAY    We are sending a referral to [address] . You should be called about this in 1-2 weeks. If no word in 2 weeks please give us a call to follow up    [address]:  USE CPAP MACHINE EVERY NIGHT  START EATING THREE HEALTHY MEALS A DAY STARTING WITH BREAKFAST  START DRINKING 1.5-2 LITERS OF WATER PER DAY (65 OUNCES)  EXERCISE 30 MINUTES A DAY 4 DAYS A WEEK  CONTINUE TAKING CELEXA 20 MG      For Smoking Cessation:  Set a quit date  Start taking Wellbutrin 7 days before quit date, take 1 tab every morning for 3 days then increase to 1 tab twice a day, LAST DOSE BEFORE 2PM  Get rid of all smoking material, lighters, jai trays, old boxes of cigarettes before quit date  Enlist family and friend support  Come up with alternative to smoking with you get cravings like deep breathing, exercise ball, chewing gum, sugarless hard candy       Stopping Smoking: Care Instructions  Your Care Instructions    Cigarette smokers crave the nicotine in cigarettes. Giving it up is much harder than simply changing a habit. Your body has to stop craving the nicotine. It is hard to quit, but you can do it. There are many tools that people use to quit smoking. You may find that combining tools works best for you. There are several steps to quitting. First you get ready to quit. Then you get support to help you. After that, you learn new skills and behaviors to become a nonsmoker. For many people, a necessary step is getting and using medicine. Your doctor will help you set up the plan that best meets your needs. You may want to attend a smoking cessation program to help you quit smoking. When you choose a program, look for one that has proven success. Ask your doctor for ideas.  You will greatly increase your chances of success if you take medicine as well as get counseling or join a cessation program.  Some of the changes you feel when you first quit tobacco are uncomfortable. Your body will miss the nicotine at first, and you may feel short-tempered and grumpy. You may have trouble sleeping or concentrating. Medicine can help you deal with these symptoms. You may struggle with changing your smoking habits and rituals. The last step is the tricky one: Be prepared for the smoking urge to continue for a time. This is a lot to deal with, but keep at it. You will feel better. Follow-up care is a key part of your treatment and safety. Be sure to make and go to all appointments, and call your doctor if you are having problems. It's also a good idea to know your test results and keep a list of the medicines you take. How can you care for yourself at home? · Ask your family, friends, and coworkers for support. You have a better chance of quitting if you have help and support. · Join a support group, such as Nicotine Anonymous, for people who are trying to quit smoking. · Consider signing up for a smoking cessation program, such as the American Lung Association's Freedom from Smoking program.  · Set a quit date. Pick your date carefully so that it is not right in the middle of a big deadline or stressful time. Once you quit, do not even take a puff. Get rid of all ashtrays and lighters after your last cigarette. Clean your house and your clothes so that they do not smell of smoke. · Learn how to be a nonsmoker. Think about ways you can avoid those things that make you reach for a cigarette. ¨ Avoid situations that put you at greatest risk for smoking. For some people, it is hard to have a drink with friends without smoking. For others, they might skip a coffee break with coworkers who smoke. ¨ Change your daily routine. Take a different route to work or eat a meal in a different place. · Cut down on stress.  Calm yourself or release tension by doing an activity you enjoy, such as reading a book, taking a hot bath, or gardening. · Talk to your doctor or pharmacist about nicotine replacement therapy, which replaces the nicotine in your body. You still get nicotine but you do not use tobacco. Nicotine replacement products help you slowly reduce the amount of nicotine you need. These products come in several forms, many of them available over-the-counter:  ¨ Nicotine patches  ¨ Nicotine gum and lozenges  ¨ Nicotine inhaler  · Ask your doctor about bupropion (Wellbutrin) or varenicline (Chantix), which are prescription medicines. They do not contain nicotine. They help you by reducing withdrawal symptoms, such as stress and anxiety. · Some people find hypnosis, acupuncture, and massage helpful for ending the smoking habit. · Eat a healthy diet and get regular exercise. Having healthy habits will help your body move past its craving for nicotine. · Be prepared to keep trying. Most people are not successful the first few times they try to quit. Do not get mad at yourself if you smoke again. Make a list of things you learned and think about when you want to try again, such as next week, next month, or next year. Where can you learn more? Go to http://pooja-susana.info/. Enter O889 in the search box to learn more about \"Stopping Smoking: Care Instructions. \"  Current as of: March 20, 2017  Content Version: 11.4  © 2656-4813 Healthwise, Incorporated. Care instructions adapted under license by [employer] (which disclaims liability or warranty for this information). If you have questions about a medical condition or this instruction, always ask your healthcare professional. [place] [address] any warranty or liability for your use of this information. Body Mass Index: Care Instructions  Your Care Instructions    Body mass index (BMI) can help you see if your weight is raising your risk for health problems.  It uses a formula to compare how much you weigh with how tall you are. · A BMI lower than 18.5 is considered underweight. · A BMI between 18.5 and 24.9 is considered healthy. · A BMI between 25 and 29.9 is considered overweight. A BMI of 30 or higher is considered obese. If your BMI is in the normal range, it means that you have a lower risk for weight-related health problems. If your BMI is in the overweight or obese range, you may be at increased risk for weight-related health problems, such as high blood pressure, heart disease, stroke, arthritis or joint pain, and diabetes. If your BMI is in the underweight range, you may be at increased risk for health problems such as fatigue, lower protection (immunity) against illness, muscle loss, bone loss, hair loss, and hormone problems. BMI is just one measure of your risk for weight-related health problems. You may be at higher risk for health problems if you are not active, you eat an unhealthy diet, or you drink too much alcohol or use tobacco products. Follow-up care is a key part of your treatment and safety. Be sure to make and go to all appointments, and call your doctor if you are having problems. It's also a good idea to know your test results and keep a list of the medicines you take. How can you care for yourself at home? · Practice healthy eating habits. This includes eating plenty of fruits, vegetables, whole grains, lean protein, and low-fat dairy. · If your doctor recommends it, get more exercise. Walking is a good choice. Bit by bit, increase the amount you walk every day. Try for at least 30 minutes on most days of the week. · Do not smoke. Smoking can increase your risk for health problems. If you need help quitting, talk to your doctor about stop-smoking programs and medicines. These can increase your chances of quitting for good. · Limit alcohol to 2 drinks a day for men and 1 drink a day for women.  Too much alcohol can cause health problems. If you have a BMI higher than 25  · Your doctor may do other tests to check your risk for weight-related health problems. This may include measuring the distance around your waist. A waist measurement of more than 40 inches in men or 35 inches in women can increase the risk of weight-related health problems. · Talk with your doctor about steps you can take to stay healthy or improve your health. You may need to make lifestyle changes to lose weight and stay healthy, such as changing your diet and getting regular exercise. If you have a BMI lower than 18.5  · Your doctor may do other tests to check your risk for health problems. · Talk with your doctor about steps you can take to stay healthy or improve your health. You may need to make lifestyle changes to gain or maintain weight and stay healthy, such as getting more healthy foods in your diet and doing exercises to build muscle. Where can you learn more? Go to http://pooja-susana.info/. Enter S176 in the search box to learn more about \"Body Mass Index: Care Instructions. \"  Current as of: October 13, 2016  Content Version: 11.4  © 4386-2548 Healthwise, Incorporated. Care instructions adapted under license by [employer] (which disclaims liability or warranty for this information). If you have questions about a medical condition or this instruction, always ask your healthcare professional. [address] any warranty or liability for your use of this information.

## 2018-06-15 NOTE — MR AVS SNAPSHOT
[address] [place] [address] [address] [address] 
[phone] Patient: Margarito Sky MRN: [id number] PPE:[id number] Visit Information Date & Time Provider Department Dept. Phone Encounter #  
 6/15/2018  1:30 PM Reynaldo Lemus MD [address] [facility] [address] [phone] [id number] Follow-up Instructions Return in about 6 weeks (around 7/27/2018), or if symptoms worsen or fail to improve, for MOOD, SMOKING. Upcoming Health Maintenance Date Due FOBT Q 1 YEAR AGE 50-75 7/5/2002 ZOSTER VACCINE AGE 60> 5/5/2012 MICROALBUMIN Q1 6/2/2017 Bone Densitometry (Dexa) Screening 7/5/2017 Influenza Age 5 to Adult 8/1/2018 Pneumococcal 65+ Low/Medium Risk (2 of 2 - PPSV23) 10/17/2018 HEMOGLOBIN A1C Q6M 11/4/2018 LIPID PANEL Q1 5/4/2019 EYE EXAM RETINAL OR DILATED Q1 5/18/2019 FOOT EXAM Q1 6/15/2019 BREAST CANCER SCRN MAMMOGRAM 5/10/2020 GLAUCOMA SCREENING Q2Y 5/18/2020 DTaP/Tdap/Td series (2 - Td) 3/6/2024 Allergies as of 6/15/2018  Review Complete On: 6/15/2018 By: Reynaldo Lemus MD  
  
 Severity Noted Reaction Type Reactions Penicillin G  03/23/2017    Unknown (comments) Current Immunizations  Never Reviewed Name Date Influenza Vaccine 10/17/2017 12:00 AM, 10/3/2016 12:00 AM, 12/20/2012 12:00 AM, 12/2/2011 12:00 AM, 10/4/2010 12:00 AM  
 Novel Influenza-H1N1-09, All Formulations 1/19/2010 12:00 AM  
 Pneumococcal Conjugate (PCV-13) 10/17/2017 12:00 AM  
 Pneumococcal Polysaccharide (PPSV-23) 9/14/2012 12:00 AM  
 Tdap 3/6/2014 12:00 AM  
  
 Not reviewed this visit You Were Diagnosed With   
  
 Codes Comments Controlled type 2 diabetes mellitus without complication, without long-term current use of insulin ([facility]    -  Primary ICD-10-CM: E11.9 ICD-9-CM: 250.00  Morbid obesity with body mass index of 60.0-69.9 in adult [facility])     ICD-10-CM: E66.01, Z68.44 
 ICD-9-CM: 278.01, V85.44 Systolic murmur     [facility]-GP: R01.1 ICD-9-CM: 247. 2 Colon cancer screening     ICD-10-CM: Z12.11 ICD-9-CM: V76.51 Tinea pedis of both feet     ICD-10-CM: B35.3 ICD-9-CM: 110.4 Onychomycosis     ICD-10-CM: B35.1 ICD-9-CM: 110.1 Cigarette nicotine dependence without complication     UGX-16-SC: F17.210 ICD-9-CM: 305.1 MDD (major depressive disorder), recurrent episode, moderate (HCC)     ICD-10-CM: F33.1 ICD-9-CM: 296.32 Vitals BP Pulse Temp Resp Height(growth percentile) Weight(growth percentile) 132/77 82 97.8 °F (36.6 °C) (Oral) 18 5' 4\" (1.626 m) (!) 374 lb (169.6 kg) SpO2 BMI OB Status Smoking Status 97% 64.2 kg/m2 Postmenopausal Current Every Day Smoker BMI and BSA Data Body Mass Index Body Surface Area  
 64.2 kg/m 2 2.77 m 2 Preferred Pharmacy Pharmacy Name Phone [facility]/PHARMACY #[id number] [address] [phone] Your Updated Medication List  
  
   
This list is accurate as of 6/15/18  2:54 PM.  Always use your most recent med list.  
  
  
  
  
 acetaminophen 650 mg Tber Commonly known as:  TYLENOL Take 650 mg by mouth every six (6) hours as needed for Pain. albuterol 90 mcg/actuation inhaler Commonly known as:  PROVENTIL HFA, VENTOLIN HFA, PROAIR HFA Take  by inhalation. alcohol swabs Padm Commonly known as:  ALCOHOL PADS Use as needed ASPIRIN LOW-STRENGTH PO Take  by mouth. atorvastatin 20 mg tablet Commonly known as:  LIPITOR  
TAKE ONE TABLET BY MOUTH EVERY NIGHT AT BEDTIME  
  
 BREO ELLIPTA 100-25 mcg/dose inhaler Generic drug:  fluticasone-vilanterol buPROPion  mg SR tablet Commonly known as:  Kailey Villavicencio Take 1 tab PO q AM for 3 days then take 1 tab PO BID, last dose before 2PM  
  
 citalopram 20 mg tablet Commonly known as:  Deysi Enrique Take 1 Tab by mouth daily. fluticasone 50 mcg/actuation nasal spray Commonly known as:  FLONASE ALLERGY RELIEF  
2 Sprays by Both Nostrils route daily. ketoconazole 2 % topical cream  
Commonly known as:  NIZORAL Apply  to affected area two (2) times a day. INBETWEEN TOES AND BOTTOM OF BOTH FEET  
  
 lisinopril-hydroCHLOROthiazide 10-12.5 mg per tablet Commonly known as:  PRINZIDE, ZESTORETIC  
TAKE 1 TABLET BY MOUTH EVERY DAY Menthol-Zinc Oxide 0.44-20.6 % Oint Commonly known as:  Calmoseptine Apply  to affected area. * metFORMIN 500 mg tablet Commonly known as:  GLUCOPHAGE  
TAKE ONE TABLET BY MOUTH TWO TIMES A DAY  
  
 * metFORMIN 500 mg tablet Commonly known as:  GLUCOPHAGE Take 1 Tab by mouth two (2) times daily (with meals). montelukast 10 mg tablet Commonly known as:  SINGULAIR  
TAKE ONE TABLET BY MOUTH EVERY NIGHT AT BEDTIME  
  
 solifenacin 5 mg tablet Commonly known as:  Mai Evangelista Take 1 Tab by mouth daily. Indications: URINARY URGE INCONTINENCE  
  
 SPIRIVA WITH HANDIHALER 18 mcg inhalation capsule Generic drug:  tiotropium INHALE 1 CAPSULE VIA HANDIHALER ONCE DAILY AT THE SAME TIME EVERY DAY  
  
 * Notice: This list has 2 medication(s) that are the same as other medications prescribed for you. Read the directions carefully, and ask your doctor or other care provider to review them with you. Prescriptions Sent to Pharmacy Refills  
 ketoconazole (NIZORAL) 2 % topical cream 1 Sig: Apply  to affected area two (2) times a day. INBETWEEN TOES AND BOTTOM OF BOTH FEET Class: Normal  
 Pharmacy: [facility]/pharmacy #[id number] [address] Ph #: [phone] Route: Topical  
 buPROPion SR (WELLBUTRIN SR) 150 mg SR tablet 2 Sig: Take 1 tab PO q AM for 3 days then take 1 tab PO BID, last dose before 2PM  
 Class: Normal  
 Pharmacy: [facility] [address] Ph #: [phone] We Performed the Following AMB POC EKG ROUTINE W/ 12 LEADS, INTER & REP [35798 CPT(R)] Follow-up Instructions Return in about 6 weeks (around 7/27/2018), or if symptoms worsen or fail to improve, for MOOD, SMOKING. To-Do List   
 06/15/2018 Lab:  MICROALBUMIN, UR, RAND W/ MICROALB/CREAT RATIO   
  
 06/22/2018 ECHO:  2D ECHO COMPLETE ADULT (TTE) W OR WO CONTR   
  
 07/15/2018 Lab:  OCCULT BLOOD, IMMUNOASSAY (FIT) Patient Instructions SMOKING NEAR OXYGEN TANKS CAN LEAD TO DEADLY EXPLOSIONS  
 
START USING CPAP MACHINE EVERY NIGHT FOR AT LEAST 4 HOURS DRINK AT LEAST 1.5-2 LITERS (50-68 OUNCES) OF WATER PER DAY We are sending a referral to [address] . You should be called about this in 1-2 weeks. If no word in 2 weeks please give us a call to follow up FOR DEPRESSION AND ANXIETY: 
USE CPAP MACHINE EVERY NIGHT 
START EATING THREE HEALTHY MEALS A DAY STARTING WITH BREAKFAST 
START DRINKING 1.5-2 LITERS OF WATER PER DAY (65 OUNCES) EXERCISE 30 MINUTES A DAY 4 DAYS A WEEK CONTINUE TAKING CELEXA 20 MG For Smoking Cessation: 
Set a quit date Start taking Wellbutrin 7 days before quit date, take 1 tab every morning for 3 days then increase to 1 tab twice a day, LAST DOSE BEFORE 2PM 
Get rid of all smoking material, lighters, jai trays, old boxes of cigarettes before quit date Enlist family and friend support Come up with alternative to smoking with you get cravings like deep breathing, exercise ball, chewing gum, sugarless hard candy Stopping Smoking: Care Instructions Your Care Instructions Cigarette smokers crave the nicotine in cigarettes. Giving it up is much harder than simply changing a habit. Your body has to stop craving the nicotine. It is hard to quit, but you can do it. There are many tools that people use to quit smoking. You may find that combining tools works best for you. There are several steps to quitting. First you get ready to quit. Then you get support to help you. After that, you learn new skills and behaviors to become a nonsmoker. For many people, a necessary step is getting and using medicine. Your doctor will help you set up the plan that best meets your needs. You may want to attend a smoking cessation program to help you quit smoking. When you choose a program, look for one that has proven success. Ask your doctor for ideas. You will greatly increase your chances of success if you take medicine as well as get counseling or join a cessation program. 
Some of the changes you feel when you first quit tobacco are uncomfortable. Your body will miss the nicotine at first, and you may feel short-tempered and grumpy. You may have trouble sleeping or concentrating. Medicine can help you deal with these symptoms. You may struggle with changing your smoking habits and rituals. The last step is the tricky one: Be prepared for the smoking urge to continue for a time. This is a lot to deal with, but keep at it. You will feel better. Follow-up care is a key part of your treatment and safety. Be sure to make and go to all appointments, and call your doctor if you are having problems. It's also a good idea to know your test results and keep a list of the medicines you take. How can you care for yourself at home? · Ask your family, friends, and coworkers for support. You have a better chance of quitting if you have help and support. · Join a support group, such as Nicotine Anonymous, for people who are trying to quit smoking. · Consider signing up for a smoking cessation program, such as the American Lung Association's Freedom from Smoking program. 
· Set a quit date. Pick your date carefully so that it is not right in the middle of a big deadline or stressful time. Once you quit, do not even take a puff.  Get rid of all ashtrays and lighters after your last cigarette. Clean your house and your clothes so that they do not smell of smoke. · Learn how to be a nonsmoker. Think about ways you can avoid those things that make you reach for a cigarette. ¨ Avoid situations that put you at greatest risk for smoking. For some people, it is hard to have a drink with friends without smoking. For others, they might skip a coffee break with coworkers who smoke. ¨ Change your daily routine. Take a different route to work or eat a meal in a different place. · Cut down on stress. Calm yourself or release tension by doing an activity you enjoy, such as reading a book, taking a hot bath, or gardening. · Talk to your doctor or pharmacist about nicotine replacement therapy, which replaces the nicotine in your body. You still get nicotine but you do not use tobacco. Nicotine replacement products help you slowly reduce the amount of nicotine you need. These products come in several forms, many of them available over-the-counter: ¨ Nicotine patches ¨ Nicotine gum and lozenges ¨ Nicotine inhaler · Ask your doctor about bupropion (Wellbutrin) or varenicline (Chantix), which are prescription medicines. They do not contain nicotine. They help you by reducing withdrawal symptoms, such as stress and anxiety. · Some people find hypnosis, acupuncture, and massage helpful for ending the smoking habit. · Eat a healthy diet and get regular exercise. Having healthy habits will help your body move past its craving for nicotine. · Be prepared to keep trying. Most people are not successful the first few times they try to quit. Do not get mad at yourself if you smoke again. Make a list of things you learned and think about when you want to try again, such as next week, next month, or next year. Where can you learn more? Go to http://pojoa-susana.info/. Enter A469 in the search box to learn more about \"Stopping Smoking: Care Instructions. \" Current as of: March 20, 2017 Content Version: 11.4 © 6037-3160 [employer]. Care instructions adapted under license by [employer] (which disclaims liability or warranty for this information). If you have questions about a medical condition or this instruction, always ask your healthcare professional. [address] any warranty or liability for your use of this information. Body Mass Index: Care Instructions Your Care Instructions Body mass index (BMI) can help you see if your weight is raising your risk for health problems. It uses a formula to compare how much you weigh with how tall you are. · A BMI lower than 18.5 is considered underweight. · A BMI between 18.5 and 24.9 is considered healthy. · A BMI between 25 and 29.9 is considered overweight. A BMI of 30 or higher is considered obese. If your BMI is in the normal range, it means that you have a lower risk for weight-related health problems. If your BMI is in the overweight or obese range, you may be at increased risk for weight-related health problems, such as high blood pressure, heart disease, stroke, arthritis or joint pain, and diabetes. If your BMI is in the underweight range, you may be at increased risk for health problems such as fatigue, lower protection (immunity) against illness, muscle loss, bone loss, hair loss, and hormone problems. BMI is just one measure of your risk for weight-related health problems. You may be at higher risk for health problems if you are not active, you eat an unhealthy diet, or you drink too much alcohol or use tobacco products. Follow-up care is a key part of your treatment and safety. Be sure to make and go to all appointments, and call your doctor if you are having problems. It's also a good idea to know your test results and keep a list of the medicines you take. How can you care for yourself at home? · Practice healthy eating habits.  This includes eating plenty of fruits, vegetables, whole grains, lean protein, and low-fat dairy. · If your doctor recommends it, get more exercise. Walking is a good choice. Bit by bit, increase the amount you walk every day. Try for at least 30 minutes on most days of the week. · Do not smoke. Smoking can increase your risk for health problems. If you need help quitting, talk to your doctor about stop-smoking programs and medicines. These can increase your chances of quitting for good. · Limit alcohol to 2 drinks a day for men and 1 drink a day for women. Too much alcohol can cause health problems. If you have a BMI higher than 25 · Your doctor may do other tests to check your risk for weight-related health problems. This may include measuring the distance around your waist. A waist measurement of more than 40 inches in men or 35 inches in women can increase the risk of weight-related health problems. · Talk with your doctor about steps you can take to stay healthy or improve your health. You may need to make lifestyle changes to lose weight and stay healthy, such as changing your diet and getting regular exercise. If you have a BMI lower than 18.5 · Your doctor may do other tests to check your risk for health problems. · Talk with your doctor about steps you can take to stay healthy or improve your health. You may need to make lifestyle changes to gain or maintain weight and stay healthy, such as getting more healthy foods in your diet and doing exercises to build muscle. Where can you learn more? Go to http://pooja-susana.info/. Enter S176 in the search box to learn more about \"Body Mass Index: Care Instructions. \" Current as of: October 13, 2016 Content Version: 11.4 © 1961-2103 Healthwise, Incorporated. Care instructions adapted under license by [employer] (which disclaims liability or warranty for this information).  If you have questions about a medical condition or this instruction, always ask your healthcare professional. [place] [address] any warranty or liability for your use of this information. Introducing [facility]! Kecia Howe introduces [employer] patient portal. Now you can access parts of your medical record, email your doctor's office, and request medication refills online. 1. In your internet browser, go to [email or web address] 2. Click on the First Time User? Click Here link in the Sign In box. You will see the New Member Sign Up page. 3. Enter your [employer] Access Code exactly as it appears below. You will not need to use this code after youve completed the sign-up process. If you do not sign up before the expiration date, you must request a new code. · [employer] Access Code: [email or web address] Expires: 8/2/2018 11:51 AM 
 
4. Enter the last four digits of your Social Security Number (xxxx) and Date of Birth (mm/dd/yyyy) as indicated and click Submit. You will be taken to the next sign-up page. 5. Create a [employer] ID. This will be your [employer] login ID and cannot be changed, so think of one that is secure and easy to remember. 6. Create a [employer] password. You can change your password at any time. 7. Enter your Password Reset Question and Answer. This can be used at a later time if you forget your password. 8. Enter your e-mail address. You will receive e-mail notification when new information is available in [address] 9. Click Sign Up. You can now view and download portions of your medical record. 10. Click the Download Summary menu link to download a portable copy of your medical information. If you have questions, please visit the Frequently Asked Questions section of the [employer] website. Remember, [employer] is NOT to be used for urgent needs. For medical emergencies, dial 911. Now available from your iPhone and Android! Please provide this summary of care documentation to your next provider. Your primary care clinician is listed as Caio Ahmadi. If you have any questions after today's visit, please call [phone].

## 2018-06-19 ENCOUNTER — TELEPHONE (OUTPATIENT)
Dept: FAMILY MEDICINE CLINIC | Age: 68
End: 2018-06-19

## 2018-06-19 NOTE — TELEPHONE ENCOUNTER
Pt requesting for US of the heart to be sent to [address] [place] [address]. [place].

## 2018-06-21 RX ORDER — ISOPROPYL ALCOHOL 70 ML/100ML
SWAB TOPICAL
Qty: 100 PAD | Refills: 0 | Status: CANCELLED | OUTPATIENT
Start: 2018-06-21

## 2018-06-22 ENCOUNTER — HOSPITAL ENCOUNTER (OUTPATIENT)
Dept: LAB | Age: 68
Discharge: HOME | End: 2018-06-22
Payer: MEDICARE

## 2018-06-22 DIAGNOSIS — Z12.11 COLON CANCER SCREENING: ICD-10-CM

## 2018-06-22 PROCEDURE — 82274 ASSAY TEST FOR BLOOD FECAL: CPT | Performed by: INTERNAL MEDICINE

## 2018-06-27 LAB — HEMOCCULT STL QL IA: NEGATIVE

## 2018-08-02 ENCOUNTER — OFFICE VISIT (OUTPATIENT)
Dept: FAMILY MEDICINE CLINIC | Age: 68
End: 2018-08-02

## 2018-08-02 ENCOUNTER — HOSPITAL ENCOUNTER (OUTPATIENT)
Dept: LAB | Age: 68
Discharge: HOME | End: 2018-08-02
Payer: MEDICARE

## 2018-08-02 VITALS
WEIGHT: 293 LBS | BODY MASS INDEX: 50.02 KG/M2 | TEMPERATURE: 97.7 F | HEART RATE: 74 BPM | RESPIRATION RATE: 18 BRPM | SYSTOLIC BLOOD PRESSURE: 119 MMHG | OXYGEN SATURATION: 93 % | DIASTOLIC BLOOD PRESSURE: 70 MMHG | HEIGHT: 64 IN

## 2018-08-02 DIAGNOSIS — E11.9 CONTROLLED TYPE 2 DIABETES MELLITUS WITHOUT COMPLICATION, WITHOUT LONG-TERM CURRENT USE OF INSULIN (HCC): ICD-10-CM

## 2018-08-02 DIAGNOSIS — J44.9 COPD, MODERATE (HCC): Primary | ICD-10-CM

## 2018-08-02 DIAGNOSIS — G47.33 OSA ON CPAP: ICD-10-CM

## 2018-08-02 DIAGNOSIS — F33.0 MDD (MAJOR DEPRESSIVE DISORDER), RECURRENT EPISODE, MILD (HCC): ICD-10-CM

## 2018-08-02 DIAGNOSIS — Z99.89 OSA ON CPAP: ICD-10-CM

## 2018-08-02 DIAGNOSIS — F17.210 CIGARETTE NICOTINE DEPENDENCE WITHOUT COMPLICATION: ICD-10-CM

## 2018-08-02 DIAGNOSIS — E66.01 MORBID OBESITY WITH BODY MASS INDEX OF 60.0-69.9 IN ADULT (HCC): ICD-10-CM

## 2018-08-02 PROCEDURE — 82570 ASSAY OF URINE CREATININE: CPT | Performed by: INTERNAL MEDICINE

## 2018-08-02 RX ORDER — OLOPATADINE HYDROCHLORIDE 7 MG/ML
1 SOLUTION OPHTHALMIC DAILY
Refills: 99 | COMMUNITY
Start: 2018-06-25

## 2018-08-02 RX ORDER — ALBUTEROL SULFATE 90 UG/1
2 AEROSOL, METERED RESPIRATORY (INHALATION)
Qty: 1 INHALER | Refills: 5 | Status: SHIPPED | OUTPATIENT
Start: 2018-08-02 | End: 2019-01-26 | Stop reason: SDUPTHER

## 2018-08-02 RX ORDER — BUPROPION HYDROCHLORIDE 150 MG/1
150 TABLET, EXTENDED RELEASE ORAL 2 TIMES DAILY
Qty: 60 TAB | Refills: 2 | Status: SHIPPED | OUTPATIENT
Start: 2018-08-02 | End: 2018-09-10 | Stop reason: SDUPTHER

## 2018-08-02 NOTE — PATIENT INSTRUCTIONS
SMOKING NEAR OXYGEN TANKS CAN LEAD TO DEADLY EXPLOSIONS. IT IS IMPORTANT TO INSTRUCT YOUR  TO SMOKE 20 FEET AWAY FROM HOUSE WITH OXYGEN TANK SO THAT IF HE BLOWS UP HE DOESN'T KILL YOU     For Smoking Cessation:  CONGRATULATIONS ON SETTING  A QUIT DATE FOR AUGUST 31, 2018  1 WEEK BEFORE QUIT DATE Get rid of all smoking material, lighters, jai trays, old boxes of cigarettes before quit date  Enlist family and friend support  Come up with alternative to smoking with you get cravings like deep breathing, exercise ball, chewing gum, sugarless hard candy    SCHEDULE APPOINTMENT WITH DR. Ino Atkinson TODAY SCHEDULE FOLLOW LUNGS AND SLEEP APNEA, IF YOU NEED A REFERAL CALL OUR CLINIC TO LET US KNOW    CONTINUE TO WORK ON EXERCISE, WALKING 30 MINUTES A DAY 4-5 DAYS A WEEK    RETURN IN 6 WEEKS         Stopping Smoking: Care Instructions  Your Care Instructions  Cigarette smokers crave the nicotine in cigarettes. Giving it up is much harder than simply changing a habit. Your body has to stop craving the nicotine. It is hard to quit, but you can do it. There are many tools that people use to quit smoking. You may find that combining tools works best for you. There are several steps to quitting. First you get ready to quit. Then you get support to help you. After that, you learn new skills and behaviors to become a nonsmoker. For many people, a necessary step is getting and using medicine. Your doctor will help you set up the plan that best meets your needs. You may want to attend a smoking cessation program to help you quit smoking. When you choose a program, look for one that has proven success. Ask your doctor for ideas. You will greatly increase your chances of success if you take medicine as well as get counseling or join a cessation program.  Some of the changes you feel when you first quit tobacco are uncomfortable. Your body will miss the nicotine at first, and you may feel short-tempered and grumpy.  You may have trouble sleeping or concentrating. Medicine can help you deal with these symptoms. You may struggle with changing your smoking habits and rituals. The last step is the tricky one: Be prepared for the smoking urge to continue for a time. This is a lot to deal with, but keep at it. You will feel better. Follow-up care is a key part of your treatment and safety. Be sure to make and go to all appointments, and call your doctor if you are having problems. It's also a good idea to know your test results and keep a list of the medicines you take. How can you care for yourself at home? · Ask your family, friends, and coworkers for support. You have a better chance of quitting if you have help and support. · Join a support group, such as Nicotine Anonymous, for people who are trying to quit smoking. · Consider signing up for a smoking cessation program, such as the American Lung Association's Freedom from Smoking program.  · Get text messaging support. Go to the website at www.smokefree. gov to sign up for the [facility] program.  · Set a quit date. Pick your date carefully so that it is not right in the middle of a big deadline or stressful time. Once you quit, do not even take a puff. Get rid of all ashtrays and lighters after your last cigarette. Clean your house and your clothes so that they do not smell of smoke. · Learn how to be a nonsmoker. Think about ways you can avoid those things that make you reach for a cigarette. ¨ Avoid situations that put you at greatest risk for smoking. For some people, it is hard to have a drink with friends without smoking. For others, they might skip a coffee break with coworkers who smoke. ¨ Change your daily routine. Take a different route to work or eat a meal in a different place. · Cut down on stress. Calm yourself or release tension by doing an activity you enjoy, such as reading a book, taking a hot bath, or gardening.   · Talk to your doctor or pharmacist about nicotine replacement therapy, which replaces the nicotine in your body. You still get nicotine but you do not use tobacco. Nicotine replacement products help you slowly reduce the amount of nicotine you need. These products come in several forms, many of them available over-the-counter:  ¨ Nicotine patches  ¨ Nicotine gum and lozenges  ¨ Nicotine inhaler  · Ask your doctor about bupropion (Wellbutrin) or varenicline (Chantix), which are prescription medicines. They do not contain nicotine. They help you by reducing withdrawal symptoms, such as stress and anxiety. · Some people find hypnosis, acupuncture, and massage helpful for ending the smoking habit. · Eat a healthy diet and get regular exercise. Having healthy habits will help your body move past its craving for nicotine. · Be prepared to keep trying. Most people are not successful the first few times they try to quit. Do not get mad at yourself if you smoke again. Make a list of things you learned and think about when you want to try again, such as next week, next month, or next year. Where can you learn more? Go to http://pooja-susana.info/. Enter F506 in the search box to learn more about \"Stopping Smoking: Care Instructions. \"  Current as of: November 29, 2017  Content Version: 11.7  © 2154-3895 [employer]. Care instructions adapted under license by [employer] (which disclaims liability or warranty for this information). If you have questions about a medical condition or this instruction, always ask your healthcare professional. [place] [address] any warranty or liability for your use of this information.

## 2018-08-02 NOTE — PROGRESS NOTES
Internal Medicine Follow Up Visit Note    Chief Complaint   Patient presents with    Results     2D Echo    Nicotine Dependence     Currently smoking at most 10 cigs/day       HPI:  Faizan Diego is a 79 y.o. female with PMHx significant for Morbid obesity, depression,  DM, HTN, DARCI/COPD and h/o CVA is here for the above chief complaint(s). Last seen 5/4/2018 for establish care. COPD/LENNON: Using breo ellipta, Spiriva and albuterol as needed. Since last visit SOB controlled with medicine. Last albuterol dose 2 weeks ago, ran out of medicine. Sleep doctor/Pulm doctor Gabriel Hernández. Using CPAP machine 2x per week. Doesn't use because only has 2 sockets and using fan and light for socket at night. Willing to get power strip. Echo no e/o systolic or diastolic dysfunction. CT scan 10/2017 mild emphysematous changes. PFTs need to be obtained. Tobacco dependence: Since last visit has cut back, now 10 cigs a day. Taking wellbutrin twice a day, quit date 8/31/2018.  and other housemate smokes in house. Reports ready to quit. Wellbutrin is helpful with smoking urge. Depression and Anxiety: unchanged since last visit per patient. However has noted improved mood with wellbutrin. Systolic Murmur: Echo discussed. Current Outpatient Prescriptions   Medication Sig    albuterol (PROVENTIL HFA, VENTOLIN HFA, PROAIR HFA) 90 mcg/actuation inhaler Take 2 Puffs by inhalation every six (6) hours as needed for Wheezing or Shortness of Breath.  buPROPion SR (WELLBUTRIN SR) 150 mg SR tablet Take 1 Tab by mouth two (2) times a day for 90 days. last dose before 2PM    citalopram (CELEXA) 20 mg tablet Take 1 Tab by mouth daily for 90 days.  metFORMIN (GLUCOPHAGE) 500 mg tablet Take 1 Tab by mouth two (2) times daily (with meals).  alcohol swabs (ALCOHOL PADS) padm Use as needed    fluticasone (FLONASE ALLERGY RELIEF) 50 mcg/actuation nasal spray 2 Sprays by Both Nostrils route daily.     BREO ELLIPTA 100-25 mcg/dose inhaler     atorvastatin (LIPITOR) 20 mg tablet TAKE ONE TABLET BY MOUTH EVERY NIGHT AT BEDTIME    lisinopril-hydroCHLOROthiazide (PRINZIDE, ZESTORETIC) 10-12.5 mg per tablet TAKE 1 TABLET BY MOUTH EVERY DAY    metFORMIN (GLUCOPHAGE) 500 mg tablet TAKE ONE TABLET BY MOUTH TWO TIMES A DAY    montelukast (SINGULAIR) 10 mg tablet TAKE ONE TABLET BY MOUTH EVERY NIGHT AT BEDTIME    SPIRIVA WITH HANDIHALER 18 mcg inhalation capsule INHALE 1 CAPSULE VIA HANDIHALER ONCE DAILY AT THE SAME TIME EVERY DAY    Menthol-Zinc Oxide (CALMOSEPTINE) 0.44-20.6 % oint Apply  to affected area.  acetaminophen (TYLENOL) 650 mg CR tablet Take 650 mg by mouth every six (6) hours as needed for Pain.  ASPIRIN LOW-STRENGTH PO Take  by mouth.  PAZEO 0.7 % drop INSTILL 1 DROPINTO BOTH EYES ONCE DAILY    ketoconazole (NIZORAL) 2 % topical cream Apply  to affected area two (2) times a day. INBETWEEN TOES AND BOTTOM OF BOTH FEET    solifenacin (VESICARE) 5 mg tablet Take 1 Tab by mouth daily. Indications: URINARY URGE INCONTINENCE     No current facility-administered medications for this visit.       Health Maintenance   Topic Date Due    ZOSTER VACCINE AGE 60>  05/05/2012    Bone Densitometry (Dexa) Screening  07/05/2017    Influenza Age 5 to Adult  08/01/2018    Pneumococcal 65+ Low/Medium Risk (2 of 2 - PPSV23) 10/17/2018    HEMOGLOBIN A1C Q6M  11/04/2018    LIPID PANEL Q1  05/04/2019    EYE EXAM RETINAL OR DILATED Q1  05/18/2019    FOOT EXAM Q1  06/15/2019    FOBT Q 1 YEAR AGE 50-75  06/22/2019    MICROALBUMIN Q1  08/02/2019    BREAST CANCER SCRN MAMMOGRAM  05/10/2020    GLAUCOMA SCREENING Q2Y  05/18/2020    DTaP/Tdap/Td series (2 - Td) 03/06/2024    Hepatitis C Screening  Completed     Immunization History   Administered Date(s) Administered    Influenza Vaccine 10/04/2010, 12/02/2011, 12/20/2012, 10/03/2016, 10/17/2017    Novel Influenza-H1N1-09, All Formulations 01/19/2010    Pneumococcal Conjugate (PCV-13) 10/17/2017    Pneumococcal Polysaccharide (PPSV-23) 09/14/2012    Tdap 03/06/2014       Allergies and Medications: Reviewed and updated in EMR. Past Medical History:   Diagnosis Date    Back pain     CVA (cerebral vascular accident) [facility]) 2015    Establishing care with new doctor, encounter for 04/20/2015   [facility] discharge follow-up 04/20/2015    Hypercholesterolemia with hypertriglyceridemia 12/02/2011    Hypertension     Impaired fasting glucose 04/20/2015    Knee pain     Osteoarthritis     Urge incontinence 04/13/2016       Family History, Social History, Past Medical History, Surgical History: Reviewed and updated in EMR as appropriate. OBJECTIVE:   Visit Vitals    /70    Pulse 74    Temp 97.7 °F (36.5 °C) (Oral)    Resp 18    Ht 5' 4\" (1.626 m)    Wt (!) 372 lb 6.4 oz (168.9 kg)    SpO2 93%    BMI 63.92 kg/m2        BP Readings from Last 3 Encounters:   08/02/18 119/70   06/15/18 132/77   05/04/18 129/67     Wt Readings from Last 3 Encounters:   08/02/18 (!) 372 lb 6.4 oz (168.9 kg)   06/15/18 (!) 374 lb (169.6 kg)   05/04/18 (!) 377 lb (171 kg)     General: Pleasant morbidly obese adult woman sitting comfortably in no acute distress  HEENT: Head is atraumatic normo-cephalic. CVS: Heart regular, rate, and rhythm. Audible S1 and S2. KELSEY. PULM: Lungs clear to auscultation bilaterally. No wheezes, rales or rhonchi. EXT: 2+ dorsalis pedis pulses bilaterally. No pedal edema bilaterally  Neuro: Alert and oriented x3  MSE: mood euthymic with underlying dysphoria, affect congruent and reactive. No SI/HI     6/15/2018 PHQ-9: 13, somewhat difficult  6/15/2018 STEVE-7: 4, somewhat difficult    8/2/2018 PHQ-9: 7, not difficult at all  8/2/2018 STEVE-7: 1, not difficult at all    Nursing Notes Reviewed.     LABS/RADIOLOGICAL TESTS:    7/11/2018  4:21 PM - [place] Incoming [facility]iology Results   Narrative Study ID: [id number]                                                    [address], [address] [place] [address],  [address]                             Adult Echocardiogram Report    Name: Russell Carlton Date: 2018 01:07 PM  MRN: [id number]                    Patient Location: CAR  : 1950                Age: 79 yrs  Height: 64 in                  Weight: 377 lb                     BSA: 2.6 m2  BP: 99/60 mmHg                 HR: 86  Gender: Female                 Account #: [de-identified]  Reason For Study: murmur  History:  htndiabetic    Ordering Physician: Pasha Lam  Referring Physician: Pasha Lam  Performed By: Jesica Burgos [facility]    Interpretation Summary  A complete two-dimensional transthoracic echocardiogram was performed (2D, M-  mode, Doppler and color flow Doppler). Technically difficult but adequate exam.    Small left ventricle, normal wall thickness with normal systolic function and  estimated ejection fraction of 60%. No regional wall motion is noted. Normal diastolic function for age. Normal size right ventricle with normal systolic function. Insufficient Tricuspid regurgitation to evaluate for PASP/ RVSP. Other findings are noted below. On prior echo report from 4/15/2015, the EF was 65% with left atrial  enlargement, RVSP 39mmHg. All lab results and radiological studies were reviewed and discussed with the patient. ASSESSMENT/PLAN:      ICD-10-CM ICD-9-CM    1. COPD, moderate ([address] J44.9 496 Continue current mgmt  F/u with pulmonary   2. Cigarette nicotine dependence without complication W25.156 030.0 buPROPion SR (WELLBUTRIN SR) 150 mg SR tablet  discussed cessation goals   3.  Controlled type 2 diabetes mellitus without complication, without long-term current use of insulin (HCC) E11.9 250.00 MICROALBUMIN, UR, RAND W/ MICROALB/CREAT RATIO  Continue current mgmt   4. MDD (major depressive disorder), recurrent episode, mild ([address]): Improved. Low risk SI F33.0 296.31 Continue current mgmt   5. Morbid obesity with body mass index of 60.0-69.9 in adult (HCC) E66.01 278.01 Discussed diet and exercise    Z68.44 V85.44    6. DARCI on CPAP G47.33 327.23 F/u with pulmonary  Continue current mgmt    Z99.89 V46.8        Requested Prescriptions     Signed Prescriptions Disp Refills    albuterol (PROVENTIL HFA, VENTOLIN HFA, PROAIR HFA) 90 mcg/actuation inhaler 1 Inhaler 5     Sig: Take 2 Puffs by inhalation every six (6) hours as needed for Wheezing or Shortness of Breath.  buPROPion SR (WELLBUTRIN SR) 150 mg SR tablet 60 Tab 2     Sig: Take 1 Tab by mouth two (2) times a day for 90 days. last dose before 2PM     Patient verbalized understanding and agreement with the plan. Patient was given an after-visit summary. Follow-up Disposition:  Return in about 6 weeks (around 9/13/2018), or if symptoms worsen or fail to improve, for NICOTINE USE DISORDER. or sooner if worsening symptoms. More than 50% of this 25 min visit was spent counseling the patient face to face about etiology and treatment of health conditions outlined in assessment and plan. Anali Dotson M.D.   Keisha Young  [address] [facility], [address] [place] [address], [address] [place] [address] [phone]  [place] [phone]

## 2018-08-02 NOTE — PROGRESS NOTES
Chief Complaint   Patient presents with    Results     2D Echo    Nicotine Dependence     Currently smoking at most 10 cigs/day     1. Have you been to the ER, urgent care clinic since your last visit? Hospitalized since your last visit? No    2. Have you seen or consulted any other health care providers outside of the [address] [place] [address] since your last visit? Include any pap smears or colon screening.  No

## 2018-08-02 NOTE — MR AVS SNAPSHOT
[address] [place] [address] [phone] Michelle Santiago [address] 
[phone] Patient: Darline Hampton MRN: [id number] Visit Information Date & Time Provider Department Dept. Phone Encounter #  
 8/2/2018  9:30 AM Anali Dotson MD [address] [phone] [id number] Follow-up Instructions Return in about 6 weeks (around 9/13/2018), or if symptoms worsen or fail to improve, for NICOTINE USE DISORDER. Upcoming Health Maintenance Date Due ZOSTER VACCINE AGE 60> 5/5/2012 MICROALBUMIN Q1 6/2/2017 Bone Densitometry (Dexa) Screening 7/5/2017 Influenza Age 5 to Adult 8/1/2018 Pneumococcal 65+ Low/Medium Risk (2 of 2 - PPSV23) 10/17/2018 HEMOGLOBIN A1C Q6M 11/4/2018 LIPID PANEL Q1 5/4/2019 EYE EXAM RETINAL OR DILATED Q1 5/18/2019 FOOT EXAM Q1 6/15/2019 FOBT Q 1 YEAR AGE 50-75 6/22/2019 BREAST CANCER SCRN MAMMOGRAM 5/10/2020 GLAUCOMA SCREENING Q2Y 5/18/2020 DTaP/Tdap/Td series (2 - Td) 3/6/2024 Allergies as of 8/2/2018  Review Complete On: 8/2/2018 By: Anali Dotson MD  
  
 Severity Noted Reaction Type Reactions Penicillin G  03/23/2017    Unknown (comments) Current Immunizations  Never Reviewed Name Date Influenza Vaccine 10/17/2017 12:00 AM, 10/3/2016 12:00 AM, 12/20/2012 12:00 AM, 12/2/2011 12:00 AM, 10/4/2010 12:00 AM  
 Novel Influenza-H1N1-09, All Formulations 1/19/2010 12:00 AM  
 Pneumococcal Conjugate (PCV-13) 10/17/2017 12:00 AM  
 Pneumococcal Polysaccharide (PPSV-23) 9/14/2012 12:00 AM  
 Tdap 3/6/2014 12:00 AM  
  
 Not reviewed this visit You Were Diagnosed With   
  
 Codes Comments COPD, moderate ([address]    -  Primary ICD-10-CM: J44.9 ICD-9-CM: 686 Cigarette nicotine dependence without complication     XJY-03-HC: F17.210 ICD-9-CM: 305.1  Controlled type 2 diabetes mellitus without complication, without long-term current use of insulin (HCC)     ICD-10-CM: E11.9 ICD-9-CM: 250.00   
 MDD (major depressive disorder), recurrent episode, mild (HCC)     ICD-10-CM: F33.0 ICD-9-CM: 296.31 Morbid obesity with body mass index of 60.0-69.9 in adult [facility])     ICD-10-CM: E66.01, Z68.44 
ICD-9-CM: 278.01, V85.44   
 DARCI on CPAP     ICD-10-CM: G47.33, Z99.89 ICD-9-CM: 327.23, V46.8 Urge incontinence     ICD-10-CM: N39.41 
ICD-9-CM: 788.31 Vitals BP Pulse Temp Resp Height(growth percentile) Weight(growth percentile) 119/70 74 97.7 °F (36.5 °C) (Oral) 18 5' 4\" (1.626 m) (!) 372 lb 6.4 oz (168.9 kg) SpO2 BMI OB Status Smoking Status 93% 63.92 kg/m2 Postmenopausal Current Every Day Smoker BMI and BSA Data Body Mass Index Body Surface Area  
 63.92 kg/m 2 2.76 m 2 Preferred Pharmacy Pharmacy Name Phone [id number] [address] [phone] Your Updated Medication List  
  
   
This list is accurate as of 8/2/18 10:34 AM.  Always use your most recent med list.  
  
  
  
  
 acetaminophen 650 mg Tber Commonly known as:  TYLENOL Take 650 mg by mouth every six (6) hours as needed for Pain. albuterol 90 mcg/actuation inhaler Commonly known as:  PROVENTIL HFA, VENTOLIN HFA, PROAIR HFA Take 2 Puffs by inhalation every six (6) hours as needed for Wheezing or Shortness of Breath. alcohol swabs Padm Commonly known as:  ALCOHOL PADS Use as needed ASPIRIN LOW-STRENGTH PO Take  by mouth. atorvastatin 20 mg tablet Commonly known as:  LIPITOR  
TAKE ONE TABLET BY MOUTH EVERY NIGHT AT BEDTIME  
  
 BREO ELLIPTA 100-25 mcg/dose inhaler Generic drug:  fluticasone-vilanterol buPROPion  mg SR tablet Commonly known as:  Marge Shirts Take 1 Tab by mouth two (2) times a day for 90 days. last dose before 2PM  
  
 citalopram 20 mg tablet Commonly known as:  Krissy Paiz Take 1 Tab by mouth daily for 90 days. fluticasone 50 mcg/actuation nasal spray Commonly known as:  FLONASE ALLERGY RELIEF  
2 Sprays by Both Nostrils route daily. ketoconazole 2 % topical cream  
Commonly known as:  NIZORAL Apply  to affected area two (2) times a day. INBETWEEN TOES AND BOTTOM OF BOTH FEET  
  
 lisinopril-hydroCHLOROthiazide 10-12.5 mg per tablet Commonly known as:  PRINZIDE, ZESTORETIC  
TAKE 1 TABLET BY MOUTH EVERY DAY Menthol-Zinc Oxide 0.44-20.6 % Oint Commonly known as:  Calmoseptine Apply  to affected area. * metFORMIN 500 mg tablet Commonly known as:  GLUCOPHAGE  
TAKE ONE TABLET BY MOUTH TWO TIMES A DAY  
  
 * metFORMIN 500 mg tablet Commonly known as:  GLUCOPHAGE Take 1 Tab by mouth two (2) times daily (with meals). montelukast 10 mg tablet Commonly known as:  SINGULAIR  
TAKE ONE TABLET BY MOUTH EVERY NIGHT AT BEDTIME  
  
 PAZEO 0.7 % Drop Generic drug:  olopatadine INSTILL 1 DROPINTO BOTH EYES ONCE DAILY  
  
 solifenacin 5 mg tablet Commonly known as:  Chuck Player Take 1 Tab by mouth daily. Indications: URINARY URGE INCONTINENCE  
  
 SPIRIVA WITH HANDIHALER 18 mcg inhalation capsule Generic drug:  tiotropium INHALE 1 CAPSULE VIA HANDIHALER ONCE DAILY AT THE SAME TIME EVERY DAY  
  
 * Notice: This list has 2 medication(s) that are the same as other medications prescribed for you. Read the directions carefully, and ask your doctor or other care provider to review them with you. Prescriptions Sent to Pharmacy Refills  
 albuterol (PROVENTIL HFA, VENTOLIN HFA, PROAIR HFA) 90 mcg/actuation inhaler 5 Sig: Take 2 Puffs by inhalation every six (6) hours as needed for Wheezing or Shortness of Breath. Class: Normal  
 Pharmacy: [facility]/pharmacy #[id number] [address] Ph #: [phone] Route: Inhalation  buPROPion SR (WELLBUTRIN SR) 150 mg SR tablet 2  
 Sig: Take 1 Tab by mouth two (2) times a day for 90 days. last dose before 2PM  
 Class: Normal  
 [facility], [address] [place] [address] #: [phone] Route: Oral  
  
Follow-up Instructions Return in about 6 weeks (around 9/13/2018), or if symptoms worsen or fail to improve, for NICOTINE USE DISORDER. To-Do List   
 08/02/2018 Lab:  MICROALBUMIN, UR, RAND W/ MICROALB/CREAT RATIO Patient Instructions SMOKING NEAR OXYGEN TANKS CAN LEAD TO DEADLY EXPLOSIONS. IT IS IMPORTANT TO INSTRUCT YOUR  TO SMOKE 20 FEET AWAY FROM HOUSE WITH OXYGEN TANK SO THAT IF HE BLOWS UP HE DOESN'T KILL YOU 
  
For Smoking Cessation: CONGRATULATIONS ON SETTING  A QUIT DATE FOR AUGUST 31, 2018 1 WEEK BEFORE QUIT DATE Get rid of all smoking material, lighters, jai trays, old boxes of cigarettes before quit date Enlist family and friend support Come up with alternative to smoking with you get cravings like deep breathing, exercise ball, chewing gum, sugarless hard candy SCHEDULE APPOINTMENT WITH DR. Tommy Jiang TODAY SCHEDULE FOLLOW LUNGS AND SLEEP APNEA, IF YOU NEED A REFERAL CALL OUR CLINIC TO LET US KNOW 
 
CONTINUE TO WORK ON EXERCISE, WALKING 30 MINUTES A DAY 4-5 DAYS A WEEK RETURN IN 6 WEEKS Stopping Smoking: Care Instructions Your Care Instructions Cigarette smokers crave the nicotine in cigarettes. Giving it up is much harder than simply changing a habit. Your body has to stop craving the nicotine. It is hard to quit, but you can do it. There are many tools that people use to quit smoking. You may find that combining tools works best for you. There are several steps to quitting. First you get ready to quit. Then you get support to help you. After that, you learn new skills and behaviors to become a nonsmoker. For many people, a necessary step is getting and using medicine. Your doctor will help you set up the plan that best meets your needs.  You may want to attend a smoking cessation program to help you quit smoking. When you choose a program, look for one that has proven success. Ask your doctor for ideas. You will greatly increase your chances of success if you take medicine as well as get counseling or join a cessation program. 
Some of the changes you feel when you first quit tobacco are uncomfortable. Your body will miss the nicotine at first, and you may feel short-tempered and grumpy. You may have trouble sleeping or concentrating. Medicine can help you deal with these symptoms. You may struggle with changing your smoking habits and rituals. The last step is the tricky one: Be prepared for the smoking urge to continue for a time. This is a lot to deal with, but keep at it. You will feel better. Follow-up care is a key part of your treatment and safety. Be sure to make and go to all appointments, and call your doctor if you are having problems. It's also a good idea to know your test results and keep a list of the medicines you take. How can you care for yourself at home? · Ask your family, friends, and coworkers for support. You have a better chance of quitting if you have help and support. · Join a support group, such as Nicotine Anonymous, for people who are trying to quit smoking. · Consider signing up for a smoking cessation program, such as the American Lung Association's Freedom from Smoking program. 
· Get text messaging support. Go to the website at www.smokefree. gov to sign up for the [facility] program. 
· Set a quit date. Pick your date carefully so that it is not right in the middle of a big deadline or stressful time. Once you quit, do not even take a puff. Get rid of all ashtrays and lighters after your last cigarette. Clean your house and your clothes so that they do not smell of smoke. · Learn how to be a nonsmoker. Think about ways you can avoid those things that make you reach for a cigarette. ¨ Avoid situations that put you at greatest risk for smoking. For some people, it is hard to have a drink with friends without smoking. For others, they might skip a coffee break with coworkers who smoke. ¨ Change your daily routine. Take a different route to work or eat a meal in a different place. · Cut down on stress. Calm yourself or release tension by doing an activity you enjoy, such as reading a book, taking a hot bath, or gardening. · Talk to your doctor or pharmacist about nicotine replacement therapy, which replaces the nicotine in your body. You still get nicotine but you do not use tobacco. Nicotine replacement products help you slowly reduce the amount of nicotine you need. These products come in several forms, many of them available over-the-counter: ¨ Nicotine patches ¨ Nicotine gum and lozenges ¨ Nicotine inhaler · Ask your doctor about bupropion (Wellbutrin) or varenicline (Chantix), which are prescription medicines. They do not contain nicotine. They help you by reducing withdrawal symptoms, such as stress and anxiety. · Some people find hypnosis, acupuncture, and massage helpful for ending the smoking habit. · Eat a healthy diet and get regular exercise. Having healthy habits will help your body move past its craving for nicotine. · Be prepared to keep trying. Most people are not successful the first few times they try to quit. Do not get mad at yourself if you smoke again. Make a list of things you learned and think about when you want to try again, such as next week, next month, or next year. Where can you learn more? Go to http://pooja-susana.info/. Enter I292 in the search box to learn more about \"Stopping Smoking: Care Instructions. \" Current as of: November 29, 2017 Content Version: 11.7 © 2492-8229 [employer], [employer].  Care instructions adapted under license by [employer] (which disclaims liability or warranty for this information). If you have questions about a medical condition or this instruction, always ask your healthcare professional. [address] any warranty or liability for your use of this information. Introducing [facility] SERVICES! Lorena Tilley introduces [employer] patient portal. Now you can access parts of your medical record, email your doctor's office, and request medication refills online. 1. In your internet browser, go to [email or web address] 2. Click on the First Time User? Click Here link in the Sign In box. You will see the New Member Sign Up page. 3. Enter your [employer] Access Code exactly as it appears below. You will not need to use this code after youve completed the sign-up process. If you do not sign up before the expiration date, you must request a new code. · [employer] Access Code: [email or web address] Expires: 8/2/2018 11:51 AM 
 
4. Enter the last four digits of your Social Security Number (xxxx) and Date of Birth (mm/dd/yyyy) as indicated and click Submit. You will be taken to the next sign-up page. 5. Create a [employer] ID. This will be your [employer] login ID and cannot be changed, so think of one that is secure and easy to remember. 6. Create a [employer] password. You can change your password at any time. 7. Enter your Password Reset Question and Answer. This can be used at a later time if you forget your password. 8. Enter your e-mail address. You will receive e-mail notification when new information is available in [address] 9. Click Sign Up. You can now view and download portions of your medical record. 10. Click the Download Summary menu link to download a portable copy of your medical information. If you have questions, please visit the Frequently Asked Questions section of the [employer] website. Remember, [employer] is NOT to be used for urgent needs. For medical emergencies, dial 911. Now available from your iPhone and Android! Please provide this summary of care documentation to your next provider. Your primary care clinician is listed as Tracy Camacho. If you have any questions after today's visit, please call [phone].

## 2018-08-03 LAB
CREAT UR-MCNC: 260.4 MG/DL (ref 30–125)
MICROALBUMIN UR-MCNC: 1.6 MG/DL (ref 0–3)
MICROALBUMIN/CREAT UR-RTO: 6 MG/G (ref 0–30)

## 2018-09-06 DIAGNOSIS — E78.2 MIXED HYPERLIPIDEMIA: ICD-10-CM

## 2018-09-06 DIAGNOSIS — I10 ESSENTIAL HYPERTENSION, BENIGN: ICD-10-CM

## 2018-09-06 DIAGNOSIS — J30.89 ALLERGIC RHINITIS DUE TO OTHER ALLERGIC TRIGGER, UNSPECIFIED SEASONALITY: ICD-10-CM

## 2018-09-06 DIAGNOSIS — J44.9 COPD, MODERATE (HCC): Primary | ICD-10-CM

## 2018-09-06 RX ORDER — TIOTROPIUM BROMIDE 18 UG/1
CAPSULE ORAL; RESPIRATORY (INHALATION)
Qty: 90 CAP | Refills: 3 | Status: SHIPPED | OUTPATIENT
Start: 2018-09-06 | End: 2019-04-01 | Stop reason: SDUPTHER

## 2018-09-06 RX ORDER — MONTELUKAST SODIUM 10 MG/1
TABLET ORAL
Qty: 90 TAB | Refills: 3 | Status: SHIPPED | OUTPATIENT
Start: 2018-09-06 | End: 2019-04-01 | Stop reason: SDUPTHER

## 2018-09-06 RX ORDER — ATORVASTATIN CALCIUM 20 MG/1
TABLET, FILM COATED ORAL
Qty: 90 TAB | Refills: 3 | Status: SHIPPED | OUTPATIENT
Start: 2018-09-06 | End: 2019-04-01 | Stop reason: SDUPTHER

## 2018-09-06 RX ORDER — FLUTICASONE FUROATE AND VILANTEROL TRIFENATATE 100; 25 UG/1; UG/1
1 POWDER RESPIRATORY (INHALATION) DAILY
Qty: 3 INHALER | Refills: 3 | Status: SHIPPED | OUTPATIENT
Start: 2018-09-06 | End: 2019-04-01 | Stop reason: SDUPTHER

## 2018-09-06 RX ORDER — LISINOPRIL AND HYDROCHLOROTHIAZIDE 10; 12.5 MG/1; MG/1
TABLET ORAL
Qty: 90 TAB | Refills: 3 | Status: SHIPPED | OUTPATIENT
Start: 2018-09-06 | End: 2019-04-01 | Stop reason: SDUPTHER

## 2018-09-10 DIAGNOSIS — F17.210 CIGARETTE NICOTINE DEPENDENCE WITHOUT COMPLICATION: ICD-10-CM

## 2018-09-10 DIAGNOSIS — J44.9 COPD, MODERATE (HCC): ICD-10-CM

## 2018-09-10 RX ORDER — BUPROPION HYDROCHLORIDE 150 MG/1
150 TABLET, EXTENDED RELEASE ORAL 2 TIMES DAILY
Qty: 180 TAB | Refills: 1 | Status: SHIPPED | OUTPATIENT
Start: 2018-09-10 | End: 2018-11-16 | Stop reason: SDUPTHER

## 2018-09-28 ENCOUNTER — DOCUMENTATION ONLY (OUTPATIENT)
Dept: FAMILY MEDICINE CLINIC | Age: 68
End: 2018-09-28

## 2018-09-28 NOTE — TELEPHONE ENCOUNTER
Per fax from [address] [place] [address] requesting a 90 day supply of medication  Requested Prescriptions     Pending Prescriptions Disp Refills    metFORMIN (GLUCOPHAGE) 500 mg tablet 90 Tab 3     Sig: Take 1 Tab by mouth two (2) times daily (with meals).

## 2018-10-01 RX ORDER — METFORMIN HYDROCHLORIDE 500 MG/1
500 TABLET ORAL 2 TIMES DAILY WITH MEALS
Qty: 180 TAB | Refills: 3 | Status: SHIPPED | OUTPATIENT
Start: 2018-10-01 | End: 2019-04-01 | Stop reason: SDUPTHER

## 2018-10-17 ENCOUNTER — TELEPHONE (OUTPATIENT)
Dept: FAMILY MEDICINE CLINIC | Age: 68
End: 2018-10-17

## 2018-10-17 NOTE — TELEPHONE ENCOUNTER
Received refill request for hydrocortisone valerate cream 0.2%. LPN please call patient and ask for what condition does she use this cream to document in chart. Phone number pharmacy [phone], fax [phone]  Perfecto Mainland M.D.   [place] [address] [phone]  [facility] [phone]  [phone]

## 2018-10-18 NOTE — TELEPHONE ENCOUNTER
Left a message for patient to return my call. Need to find out why patient is using the hydrocortisone valerate cream 0.2%.

## 2018-11-16 ENCOUNTER — OFFICE VISIT (OUTPATIENT)
Dept: FAMILY MEDICINE CLINIC | Age: 68
End: 2018-11-16

## 2018-11-16 ENCOUNTER — HOSPITAL ENCOUNTER (OUTPATIENT)
Dept: LAB | Age: 68
Discharge: HOME | End: 2018-11-16
Payer: MEDICARE

## 2018-11-16 VITALS
TEMPERATURE: 97.4 F | DIASTOLIC BLOOD PRESSURE: 74 MMHG | HEIGHT: 64 IN | RESPIRATION RATE: 18 BRPM | WEIGHT: 293 LBS | SYSTOLIC BLOOD PRESSURE: 125 MMHG | HEART RATE: 79 BPM | OXYGEN SATURATION: 94 % | BODY MASS INDEX: 50.02 KG/M2

## 2018-11-16 DIAGNOSIS — Z99.89 USES ROLLER WALKER: ICD-10-CM

## 2018-11-16 DIAGNOSIS — F17.210 CIGARETTE NICOTINE DEPENDENCE WITHOUT COMPLICATION: Primary | ICD-10-CM

## 2018-11-16 DIAGNOSIS — Z13.820 SCREENING FOR OSTEOPOROSIS: ICD-10-CM

## 2018-11-16 DIAGNOSIS — I10 ESSENTIAL HYPERTENSION, BENIGN: ICD-10-CM

## 2018-11-16 DIAGNOSIS — E78.2 MIXED HYPERLIPIDEMIA: ICD-10-CM

## 2018-11-16 DIAGNOSIS — Z78.0 POST-MENOPAUSAL: ICD-10-CM

## 2018-11-16 DIAGNOSIS — E11.9 CONTROLLED TYPE 2 DIABETES MELLITUS WITHOUT COMPLICATION, WITHOUT LONG-TERM CURRENT USE OF INSULIN (HCC): ICD-10-CM

## 2018-11-16 DIAGNOSIS — G47.33 OSA ON CPAP: ICD-10-CM

## 2018-11-16 DIAGNOSIS — Z99.89 OSA ON CPAP: ICD-10-CM

## 2018-11-16 DIAGNOSIS — E66.01 MORBID OBESITY WITH BODY MASS INDEX OF 60.0-69.9 IN ADULT (HCC): ICD-10-CM

## 2018-11-16 DIAGNOSIS — J44.9 COPD, MODERATE (HCC): ICD-10-CM

## 2018-11-16 DIAGNOSIS — Z23 ENCOUNTER FOR IMMUNIZATION: ICD-10-CM

## 2018-11-16 PROCEDURE — 83036 HEMOGLOBIN GLYCOSYLATED A1C: CPT

## 2018-11-16 RX ORDER — BUPROPION HYDROCHLORIDE 150 MG/1
150 TABLET, EXTENDED RELEASE ORAL 2 TIMES DAILY
Qty: 180 TAB | Refills: 1 | Status: SHIPPED | OUTPATIENT
Start: 2018-11-16 | End: 2019-04-01 | Stop reason: SDUPTHER

## 2018-11-16 NOTE — PROGRESS NOTES
Chief Complaint Patient presents with  Follow-up Nicotine use disorder 1. Have you been to the ER, urgent care clinic since your last visit? Hospitalized since your last visit? No 
 
2. Have you seen or consulted any other health care providers outside of the [address] [place] [address] since your last visit? Include any pap smears or colon screening. No  
 
After obtaining consent, and per orders of Dr. Red Campa, injection of Pneumovax 23 0.5mL given IM in Left Deltoid by Lenore Alvares LPN. Patient instructed to remain in clinic for 20 minutes afterwards, and to report any adverse reaction to me immediately. After obtaining consent, and per orders of Dr. Red Campa, injection of Flu Vaccine 0.5 mL given IM in right deltoid by Lenore Alvares LPN. Patient instructed to remain in clinic for 20 minutes afterwards, and to report any adverse reaction to me immediately.

## 2018-11-16 NOTE — PROGRESS NOTES
Internal Medicine Follow Up Visit Note Chief Complaint Patient presents with  Follow-up Nicotine use disorder HPI:  Jaz Hernadez is a 79 y.o.   female with PMHx significant for Morbid obesity, depression,  DM, HTN, DARCI/COPD and h/o CVA is  is here for the above complaint(s). Request flu shot Needs 4 wheel walker repaired Tobacco dependence: Since last visit smoking 1/2 ppd every day of half cigerettes per day, last visit full cigarettes 10 per day. Taking wellbutrin 1 pill per day.  has stopped smoking. Stress level was higher around  close to previous quit date, now has gone down. Would like to set quit date for January 1st.  
 
 
Current Outpatient Medications Medication Sig  
 buPROPion SR (WELLBUTRIN SR) 150 mg SR tablet Take 1 Tab by mouth two (2) times a day for 180 days. last dose before 2PM  
 metFORMIN (GLUCOPHAGE) 500 mg tablet Take 1 Tab by mouth two (2) times daily (with meals).  lisinopril-hydroCHLOROthiazide (PRINZIDE, ZESTORETIC) 10-12.5 mg per tablet TAKE 1 TABLET BY MOUTH EVERY DAY  BREO ELLIPTA 100-25 mcg/dose inhaler Take 1 Puff by inhalation daily.  atorvastatin (LIPITOR) 20 mg tablet TAKE ONE TABLET BY MOUTH EVERY NIGHT AT BEDTIME  montelukast (SINGULAIR) 10 mg tablet TAKE ONE TABLET BY MOUTH EVERY NIGHT AT BEDTIME  SPIRIVA WITH HANDIHALER 18 mcg inhalation capsule INHALE 1 CAPSULE VIA HANDIHALER ONCE DAILY AT THE SAME TIME EVERY DAY  albuterol (PROVENTIL HFA, VENTOLIN HFA, PROAIR HFA) 90 mcg/actuation inhaler Take 2 Puffs by inhalation every six (6) hours as needed for Wheezing or Shortness of Breath.  PAZEO 0.7 % drop INSTILL 1 DROPINTO BOTH EYES ONCE DAILY  ketoconazole (NIZORAL) 2 % topical cream Apply  to affected area two (2) times a day. INBETWEEN TOES AND BOTTOM OF BOTH FEET  
 alcohol swabs (ALCOHOL PADS) padm Use as needed  fluticasone (FLONASE ALLERGY RELIEF) 50 mcg/actuation nasal spray 2 Sprays by Both Nostrils route daily.  solifenacin (VESICARE) 5 mg tablet Take 1 Tab by mouth daily. Indications: URINARY URGE INCONTINENCE  
 Menthol-Zinc Oxide (CALMOSEPTINE) 0.44-20.6 % oint Apply  to affected area.  ASPIRIN LOW-STRENGTH PO Take  by mouth.  acetaminophen (TYLENOL) 650 mg CR tablet Take 650 mg by mouth every six (6) hours as needed for Pain. No current facility-administered medications for this visit. Health Maintenance Topic Date Due  Shingrix Vaccine Age 50> (1 of 2) 07/05/2002  Bone Densitometry (Dexa) Screening  07/05/2017  Influenza Age 5 to Adult  08/01/2018  Pneumococcal 65+ Low/Medium Risk (2 of 2 - PPSV23) 10/17/2018  HEMOGLOBIN A1C Q6M  11/04/2018  MEDICARE YEARLY EXAM  12/13/2018  LIPID PANEL Q1  05/04/2019  
 EYE EXAM RETINAL OR DILATED Q1  05/18/2019  
 FOOT EXAM Q1  06/15/2019  
 FOBT Q 1 YEAR AGE 50-75  06/22/2019  MICROALBUMIN Q1  08/02/2019  BREAST CANCER SCRN MAMMOGRAM  05/10/2020  GLAUCOMA SCREENING Q2Y  05/18/2020  
 DTaP/Tdap/Td series (2 - Td) 03/06/2024  Hepatitis C Screening  Completed Immunization History Administered Date(s) Administered  Influenza Vaccine 10/04/2010, 12/02/2011, 12/20/2012, 10/03/2016, 10/17/2017  Influenza Vaccine (Tri) Adjuvanted 11/16/2018  Novel Influenza-H1N1-09, All Formulations 01/19/2010  Pneumococcal Conjugate (PCV-13) 10/17/2017  Pneumococcal Polysaccharide (PPSV-23) 09/14/2012, 11/16/2018  Tdap 03/06/2014 Allergies and Medications: Reviewed and updated in EMR. Patient Active Problem List  
Diagnosis Code  Low back pain M54.5  Knee pain M25.569  
 DJD (degenerative joint disease) M19.90  Mixed incontinence urge and stress N39.46  
 Allergic rhinitis J30.9  Controlled type 2 diabetes mellitus without complication, without long-term current use of insulin (HCC) E11.9  
 COPD, moderate (HCC) J44.9  DDD (degenerative disc disease) [id number]  Essential hypertension, benign I10  Family history of breast cancer in sister Z80.2  Intertrigo L30.4  Mixed hyperlipidemia E78.2  Morbid obesity with body mass index of 60.0-69.9 in adult (HCC) E66.01, Z68.44  
 DARCI on CPAP G47.33, Z99.89  Stasis, venous I87.8  Tear of meniscus of left knee S83.207A  Overactive bladder N32.81  Systolic murmur E86.0  History of CVA (cerebrovascular accident) Z80.78  
 Hypertensive retinopathy of both eyes H35.033  
 Cataracts, bilateral H26.9  Tinea pedis of both feet B35.3  Onychomycosis B35.1  Cigarette nicotine dependence without complication V13.148  
 MDD (major depressive disorder), recurrent episode, moderate ([facility]) F33.1  Dehydration E86.0  
 Post-menopausal Z78.0 Family History, Social History, Past Medical History, Surgical History: Reviewed and updated in EMR as appropriate. OBJECTIVE:  
Visit Vitals /74 Pulse 79 Temp 97.4 °F (36.3 °C) (Oral) Resp 18 Ht 5' 4\" (1.626 m) Wt (!) 365 lb (165.6 kg) SpO2 94% BMI 62.65 kg/m² BP Readings from Last 3 Encounters:  
11/16/18 125/74  
08/02/18 119/70  
06/15/18 132/77 Wt Readings from Last 3 Encounters:  
11/16/18 (!) 365 lb (165.6 kg) 08/02/18 (!) 372 lb 6.4 oz (168.9 kg) 06/15/18 (!) 374 lb (169.6 kg) General: Pleasant adult woman in no acute distress HEENT: Head is atraumatic normo-cephalic. Neuro: Alert and oriented x3. Gait with roller walker, steady. MSE: mood euthymic, affect congruent and reactive. No SI/HI. Nursing Notes Reviewed. All lab results and radiological studies were reviewed and discussed with the patient. ASSESSMENT/PLAN:   
  ICD-10-CM ICD-9-CM 1. Cigarette nicotine dependence without complication C73.270 563.4 buPROPion SR (WELLBUTRIN SR) 150 mg SR tablet Smoking cessation counseling given 2. Essential hypertension, benign I10 401.1 continue current mgmt 3. Controlled type 2 diabetes mellitus without complication, without long-term current use of insulin (HCC) E11.9 250.00 HEMOGLOBIN A1C WITH EAG Continue current mgmt2  
4. COPD, moderate ([address] J44.9 496 REFERRAL TO PULMONARY DISEASE  
5. DARCI on CPAP G47.33 327.23 REFERRAL TO PULMONARY DISEASE  
 Z99.89 V46.8 6. Mixed hyperlipidemia E78.2 272.2 Continue current mgmt 7. Morbid obesity with body mass index of 60.0-69.9 in adult [facility]) E66.01 278.01 Diet and exercise plan discussed BMI education provided Z68.44 V85.44   
8. Post-menopausal Z78.0 V49.81 DEXA BONE DENSITY STUDY AXIAL 9. Uses roller walker Z99.89 V46.8 REFERRAL TO OCCUPATIONAL THERAPY for roller walker repair 10. Encounter for immunization Z23 V03.89 PNEUMOCOCCAL POLYSACCHARIDE VACCINE, 23-VALENT, ADULT OR IMMUNOSUPPRESSED PT DOSE, INFLUENZA VACCINE INACTIVATED (IIV), SUBUNIT, ADJUVANTED, IM  
11. Screening for osteoporosis Z13.820 V82.81 DEXA BONE DENSITY STUDY AXIAL Requested Prescriptions Signed Prescriptions Disp Refills  buPROPion SR (WELLBUTRIN SR) 150 mg SR tablet 180 Tab 1 Sig: Take 1 Tab by mouth two (2) times a day for 180 days. last dose before 2PM  
 
Patient verbalized understanding and agreement with the plan. Patient was given an after-visit summary. Follow-up Disposition: 
Return in about 2 weeks (around 11/30/2018) for [address] [place] [address]. or sooner if worsening symptoms. More than 50% of this 25 min visit was spent counseling the patient face to face about etiology and treatment of health conditions outlined in assessment and plan. Kana Boles M.D. [employer] [address] [facility], [address] [place], [address] [facility] - [phone] Fax - [phone] or [phone]

## 2018-11-16 NOTE — ACP (ADVANCE CARE PLANNING)
Advance Care Planning (ACP) Provider Note - Comprehensive     Date of ACP Conversation: 11/16/18  Persons included in Conversation:  patient  Length of ACP Conversation in minutes:  <16 minutes (Non-Billable)    Authorized Decision Maker (if patient is incapable of making informed decisions): This person is:  n/a          General ACP for ALL Patients with Decision Making Capacity:   Importance of advance care planning, including choosing a healthcare agent to communicate patient's healthcare decisions if patient lost the ability to make decisions, such as after a sudden illness or accident    Review of Existing Advance Directive:  n/a    For Serious or Chronic Illness:  Understanding of medical condition    Understanding of CPR, goals and expected outcomes, benefits and burdens discussed.     Interventions Provided:  Recommended completion of Advance Directive form after review of ACP materials and conversation with prospective healthcare agent

## 2018-11-16 NOTE — PATIENT INSTRUCTIONS
[facility] FORM BACK NEXT VISIT For Smoking Cessation: CONGRATULATIONS ON SETTING  A QUIT DATE FOR January 1ST 2019. STOP WELLBUTRIN 
1 WEEK BEFORE QUIT DATE Get rid of all smoking material, lighters, jai trays, old boxes of cigarettes before quit date START WELLBUTRIN AGAIN 1 WEEK BEFORE QUIT DATE Enlist family and friend support Come up with alternative to smoking with you get cravings like deep breathing, exercise ball, chewing gum, sugarless hard candy FOR LUNGS: 
We are sending a referral to LUNG SPECIALIST . You should be called about this in 3-4 weeks. If no word in 4 weeks please give us a call to follow up FOR WEIGHT: 
Continue to work on low sugar/carbohydrate diet for weight loss Exercise 30 minutes a day 4-5 days week Learning About Low-Carbohydrate Diets for Weight Loss What is a low-carbohydrate diet? Low-carb diets avoid foods that are high in carbohydrate. These high-carb foods include pasta, bread, rice, cereal, fruits, and starchy vegetables. Instead, these diets usually have you eat foods that are high in fat and protein. Many people lose weight quickly on a low-carb diet. But the early weight loss is water. People on this diet often gain the weight back after they start eating carbs again. Not all diet plans are safe or work well. A lot of the evidence shows that low-carb diets aren't healthy. That's because these diets often don't include healthy foods like fruits and vegetables. Losing weight safely means balancing protein, fat, and carbs with every meal and snack. And low-carb diets don't always provide the vitamins, minerals, and fiber you need. If you have a serious medical condition, talk to your doctor before you try any diet. These conditions include kidney disease, heart disease, type 2 diabetes, high cholesterol, and high blood pressure. If you are pregnant, it may not be safe for your baby if you are on a low-carb diet. How can you lose weight safely? You might have heard that a diet plan helped another person lose weight. But that doesn't mean that it will work for you. It is very hard to stay on a diet that includes lots of big changes in your eating habits. If you want to get to a healthy weight and stay there, making healthy lifestyle changes will often work better than dieting. These steps can help. · Make a plan for change. Work with your doctor to create a plan that is right for you. · See a dietitian. He or she can show you how to make healthy changes in your eating habits. · Manage stress. If you have a lot of stress in your life, it can be hard to focus on making healthy changes to your daily habits. · Track your food and activity. You are likely to do better at losing weight if you keep track of what you eat and what you do. Follow-up care is a key part of your treatment and safety. Be sure to make and go to all appointments, and call your doctor if you are having problems. It's also a good idea to know your test results and keep a list of the medicines you take. Where can you learn more? Go to http://pooja-susana.info/. Enter A121 in the search box to learn more about \"Learning About Low-Carbohydrate Diets for Weight Loss. \" Current as of: December 8, 2016 Content Version: 11.3 © 4246-7976 [employer]. Care instructions adapted under license by [employer] (which disclaims liability or warranty for this information). If you have questions about a medical condition or this instruction, always ask your healthcare professional. [place] [address] any warranty or liability for your use of this information. Body Mass Index: Care Instructions Your Care Instructions Body mass index (BMI) can help you see if your weight is raising your risk for health problems. It uses a formula to compare how much you weigh with how tall you are. · A BMI lower than 18.5 is considered underweight. · A BMI between 18.5 and 24.9 is considered healthy. · A BMI between 25 and 29.9 is considered overweight. A BMI of 30 or higher is considered obese. If your BMI is in the normal range, it means that you have a lower risk for weight-related health problems. If your BMI is in the overweight or obese range, you may be at increased risk for weight-related health problems, such as high blood pressure, heart disease, stroke, arthritis or joint pain, and diabetes. If your BMI is in the underweight range, you may be at increased risk for health problems such as fatigue, lower protection (immunity) against illness, muscle loss, bone loss, hair loss, and hormone problems. BMI is just one measure of your risk for weight-related health problems. You may be at higher risk for health problems if you are not active, you eat an unhealthy diet, or you drink too much alcohol or use tobacco products. Follow-up care is a key part of your treatment and safety. Be sure to make and go to all appointments, and call your doctor if you are having problems. It's also a good idea to know your test results and keep a list of the medicines you take. How can you care for yourself at home? · Practice healthy eating habits. This includes eating plenty of fruits, vegetables, whole grains, lean protein, and low-fat dairy. · If your doctor recommends it, get more exercise. Walking is a good choice. Bit by bit, increase the amount you walk every day. Try for at least 30 minutes on most days of the week. · Do not smoke. Smoking can increase your risk for health problems. If you need help quitting, talk to your doctor about stop-smoking programs and medicines. These can increase your chances of quitting for good. · Limit alcohol to 2 drinks a day for men and 1 drink a day for women. Too much alcohol can cause health problems. If you have a BMI higher than 25 · Your doctor may do other tests to check your risk for weight-related health problems. This may include measuring the distance around your waist. A waist measurement of more than 40 inches in men or 35 inches in women can increase the risk of weight-related health problems. · Talk with your doctor about steps you can take to stay healthy or improve your health. You may need to make lifestyle changes to lose weight and stay healthy, such as changing your diet and getting regular exercise. If you have a BMI lower than 18.5 · Your doctor may do other tests to check your risk for health problems. · Talk with your doctor about steps you can take to stay healthy or improve your health. You may need to make lifestyle changes to gain or maintain weight and stay healthy, such as getting more healthy foods in your diet and doing exercises to build muscle. Where can you learn more? Go to http://pooja-susana.info/. Enter S176 in the search box to learn more about \"Body Mass Index: Care Instructions. \" Current as of: October 13, 2016 Content Version: 11.4 © 8682-9688 [employer]. Care instructions adapted under license by [employer] (which disclaims liability or warranty for this information). If you have questions about a medical condition or this instruction, always ask your healthcare professional. [address] any warranty or liability for your use of this information.

## 2018-11-17 LAB
EST. AVERAGE GLUCOSE BLD GHB EST-MCNC: 120 MG/DL
HBA1C MFR BLD: 5.8 % (ref 4.2–5.6)

## 2018-11-28 DIAGNOSIS — E11.9 CONTROLLED TYPE 2 DIABETES MELLITUS WITHOUT COMPLICATION, WITHOUT LONG-TERM CURRENT USE OF INSULIN (HCC): ICD-10-CM

## 2018-12-05 ENCOUNTER — TELEPHONE (OUTPATIENT)
Dept: PEDIATRICS CLINIC | Age: 68
End: 2018-12-05

## 2018-12-05 ENCOUNTER — OFFICE VISIT (OUTPATIENT)
Dept: FAMILY MEDICINE CLINIC | Age: 68
End: 2018-12-05

## 2018-12-05 VITALS
HEART RATE: 81 BPM | TEMPERATURE: 98.4 F | RESPIRATION RATE: 20 BRPM | DIASTOLIC BLOOD PRESSURE: 67 MMHG | WEIGHT: 293 LBS | BODY MASS INDEX: 50.02 KG/M2 | SYSTOLIC BLOOD PRESSURE: 129 MMHG | HEIGHT: 64 IN | OXYGEN SATURATION: 99 %

## 2018-12-05 DIAGNOSIS — E11.9 CONTROLLED TYPE 2 DIABETES MELLITUS WITHOUT COMPLICATION, WITHOUT LONG-TERM CURRENT USE OF INSULIN (HCC): ICD-10-CM

## 2018-12-05 DIAGNOSIS — J44.9 COPD, MODERATE (HCC): Primary | ICD-10-CM

## 2018-12-05 DIAGNOSIS — F17.210 CIGARETTE NICOTINE DEPENDENCE WITHOUT COMPLICATION: ICD-10-CM

## 2018-12-05 DIAGNOSIS — Z99.89 OSA ON CPAP: ICD-10-CM

## 2018-12-05 DIAGNOSIS — G47.33 OSA ON CPAP: ICD-10-CM

## 2018-12-05 DIAGNOSIS — Z00.00 MEDICARE ANNUAL WELLNESS VISIT, SUBSEQUENT: ICD-10-CM

## 2018-12-05 DIAGNOSIS — N32.81 OVERACTIVE BLADDER: ICD-10-CM

## 2018-12-05 DIAGNOSIS — N39.46 MIXED INCONTINENCE URGE AND STRESS: ICD-10-CM

## 2018-12-05 NOTE — PROGRESS NOTES
Internal Medicine Follow Up Visit Note Chief Complaint Patient presents with  
 Nicotine Dependence HPI:  Luisa Farris is a 79 y.o.  female with history significant for Morbid obesity, depression,  DM, HTN, DARCI/COPD and h/o CVA  is here for the above complaint(s). COPD vs Moderate persistent asthma: using albuterol rescue once a week. breo 200 mg qAM, continue spiriva, singulair and proair. Last seen by pulmonary >1 year ago. Referral placed last visit. Bladder spasms, urinary incontinence mixed: Pt was seen 7/2017 by urology and started on vesicare for incontinence and bladder urgency sx. Urinary symptoms better since stopping vesicare. Current Outpatient Medications Medication Sig  
 buPROPion SR (WELLBUTRIN SR) 150 mg SR tablet Take 1 Tab by mouth two (2) times a day for 180 days. last dose before 2PM  
 metFORMIN (GLUCOPHAGE) 500 mg tablet Take 1 Tab by mouth two (2) times daily (with meals). (Patient taking differently: Take 500 mg by mouth daily (with breakfast). )  lisinopril-hydroCHLOROthiazide (PRINZIDE, ZESTORETIC) 10-12.5 mg per tablet TAKE 1 TABLET BY MOUTH EVERY DAY  BREO ELLIPTA 100-25 mcg/dose inhaler Take 1 Puff by inhalation daily.  atorvastatin (LIPITOR) 20 mg tablet TAKE ONE TABLET BY MOUTH EVERY NIGHT AT BEDTIME  montelukast (SINGULAIR) 10 mg tablet TAKE ONE TABLET BY MOUTH EVERY NIGHT AT BEDTIME  SPIRIVA WITH HANDIHALER 18 mcg inhalation capsule INHALE 1 CAPSULE VIA HANDIHALER ONCE DAILY AT THE SAME TIME EVERY DAY  albuterol (PROVENTIL HFA, VENTOLIN HFA, PROAIR HFA) 90 mcg/actuation inhaler Take 2 Puffs by inhalation every six (6) hours as needed for Wheezing or Shortness of Breath.  PAZEO 0.7 % drop INSTILL 1 DROPINTO BOTH EYES ONCE DAILY  ketoconazole (NIZORAL) 2 % topical cream Apply  to affected area two (2) times a day. INBETWEEN TOES AND BOTTOM OF BOTH FEET  
 alcohol swabs (ALCOHOL PADS) padm Use as needed  fluticasone (FLONASE ALLERGY RELIEF) 50 mcg/actuation nasal spray 2 Sprays by Both Nostrils route daily.  Menthol-Zinc Oxide (CALMOSEPTINE) 0.44-20.6 % oint Apply  to affected area.  acetaminophen (TYLENOL) 650 mg CR tablet Take 650 mg by mouth every six (6) hours as needed for Pain.  ASPIRIN LOW-STRENGTH PO Take  by mouth. No current facility-administered medications for this visit. Health Maintenance Topic Date Due  Shingrix Vaccine Age 50> (1 of 2) 07/05/2002  LIPID PANEL Q1  05/04/2019  
 HEMOGLOBIN A1C Q6M  05/16/2019  
 FOOT EXAM Q1  06/15/2019  
 FOBT Q 1 YEAR AGE 50-75  06/22/2019  MICROALBUMIN Q1  08/02/2019  MEDICARE YEARLY EXAM  12/06/2019  BREAST CANCER SCRN MAMMOGRAM  05/10/2020  GLAUCOMA SCREENING Q2Y  05/18/2020  
 EYE EXAM RETINAL OR DILATED  05/18/2020  
 DTaP/Tdap/Td series (2 - Td) 03/06/2024  Hepatitis C Screening  Completed  Bone Densitometry (Dexa) Screening  Completed  Pneumococcal 65+ Low/Medium Risk  Completed  Influenza Age 5 to Adult  Completed Immunization History Administered Date(s) Administered  Influenza Vaccine 10/04/2010, 12/02/2011, 12/20/2012, 10/03/2016, 10/17/2017  Influenza Vaccine (Tri) Adjuvanted 11/16/2018  Novel Influenza-H1N1-09, All Formulations 01/19/2010  Pneumococcal Conjugate (PCV-13) 10/17/2017  Pneumococcal Polysaccharide (PPSV-23) 09/14/2012, 11/16/2018  Tdap 03/06/2014 Allergies and Medications: Reviewed and updated in EMR. Patient Active Problem List  
Diagnosis Code  Low back pain M54.5  Knee pain M25.569  
 DJD (degenerative joint disease) M19.90  Mixed incontinence urge and stress N39.46  
 Allergic rhinitis J30.9  Controlled type 2 diabetes mellitus without complication, without long-term current use of insulin (HCC) E11.9  
 COPD, moderate (HCC) J44.9  DDD (degenerative disc disease) [id number]  Essential hypertension, benign I10  
  Family history of breast cancer in sister Z80.2  Intertrigo L30.4  Mixed hyperlipidemia E78.2  Morbid obesity with body mass index of 60.0-69.9 in adult (HCC) E66.01, Z68.44  
 DARCI on CPAP G47.33, Z99.89  Stasis, venous I87.8  Tear of meniscus of left knee S83.207A  Overactive bladder N32.81  Systolic murmur H40.0  History of CVA (cerebrovascular accident) Z80.78  
 Hypertensive retinopathy of both eyes H35.033  
 Cataracts, bilateral H26.9  Tinea pedis of both feet B35.3  Onychomycosis B35.1  Cigarette nicotine dependence without complication D23.698  
 MDD (major depressive disorder), recurrent episode, moderate ([facility]) F33.1  Dehydration E86.0  
 Post-menopausal Z78.0 Family History, Social History, Past Medical History, Surgical History: Reviewed and updated in EMR as appropriate. OBJECTIVE:  
Visit Vitals /67 Pulse 81 Temp 98.4 °F (36.9 °C) (Oral) Resp 20 Ht 5' 4\" (1.626 m) Wt (!) 357 lb 12.8 oz (162.3 kg) SpO2 99% Comment: ra  
BMI 61.42 kg/m² BP Readings from Last 3 Encounters:  
12/05/18 129/67  
11/16/18 125/74  
08/02/18 119/70 Wt Readings from Last 3 Encounters:  
12/05/18 (!) 357 lb 12.8 oz (162.3 kg)  
11/16/18 (!) 365 lb (165.6 kg) 08/02/18 (!) 372 lb 6.4 oz (168.9 kg) General: Pleasant adult woman in no acute distress HEENT: Head is atraumatic normo-cephalic. PULM: Lungs clear to auscultation bilaterally. No wheezes, rales or rhonchi. Neuro: Alert and oriented x3. Priscilla Garza MSE: mood euthymic, affect congruent and reactive. Nursing Notes Reviewed. LABS/RADIOLOGICAL TESTS: 
 
DEXA SCAN: WNL 
 
11/17/2018  1:10 AM - [facility]  
Component Value Flag Ref Range Units Status Hemoglobin A1c 5.8 Abnormally high   H  4.2 - 5.6 % Final  
 
All lab results and radiological studies were reviewed and discussed with the patient. ASSESSMENT/PLAN:   
  ICD-10-CM ICD-9-CM 1. COPD, moderate (HCC) J44.9 496 PULMONARY FUNCTION TEST Continue current mgmt F/u with pulmonary referral  
2. DARCI on CPAP G47.33 327.23 Continue current mgmt Z99.89 V46.8 3. Cigarette nicotine dependence without complication V87.130 114.9 PULMONARY FUNCTION TEST Encouraged cessation, discussed alternatives 4. Overactive bladder N32.81 596.51 urology information given encouraged to schedule appointment D/c vesicare 5. Mixed incontinence urge and stress N39.46 788.33 urology information given encouraged to schedule appointment D/c vesicare 6. Medicare annual wellness visit, subsequent Z00.00 V70.0 Completed, given AVS with HCM plan for year 7. Controlled type 2 diabetes mellitus without complication, without long-term current use of insulin (HCC) E11.9 250.00 Cut back metformin to 500 mg once a day Low sugar diet Weight loss encouraged Requested Prescriptions No prescriptions requested or ordered in this encounter Patient verbalized understanding and agreement with the plan. Patient was given an after-visit summary. Follow-up Disposition: 
Return in about 6 months (around 6/5/2019), or if symptoms worsen or fail to improve, for DM, HTN, COPD. or sooner if worsening symptoms. More than 50% of this 40 min visit was spent counseling the patient face to face about etiology and treatment of health conditions outlined in assessment and plan. Joon Arellano M.D. Karie Ross [address] [place] [address], [address] [place], [address] [place] [address] - [phone] Fax - [phone] or [phone] MEDICARE WELLNESS NOTE BELOW: 
 
Chemo Son is a 79 y.o. female and presents for annual Medicare Wellness Visit. Problem List: Reviewed with patient and discussed risk factors. Patient Active Problem List  
Diagnosis Code  Low back pain M54.5  Knee pain M25.569  
 DJD (degenerative joint disease) M19.90  Mixed incontinence urge and stress N39.46  
  Allergic rhinitis J30.9  Controlled type 2 diabetes mellitus without complication, without long-term current use of insulin (HCC) E11.9  
 COPD, moderate ([facility]) J44.9  DDD (degenerative disc disease) [id number]  Essential hypertension, benign I10  Family history of breast cancer in sister Z80.2  Intertrigo L30.4  Mixed hyperlipidemia E78.2  Morbid obesity with body mass index of 60.0-69.9 in adult (HCC) E66.01, Z68.44  
 DARCI on CPAP G47.33, Z99.89  Stasis, venous I87.8  Tear of meniscus of left knee S83.207A  Overactive bladder N32.81  Systolic murmur J56.0  History of CVA (cerebrovascular accident) Z80.78  
 Hypertensive retinopathy of both eyes H35.033  
 Cataracts, bilateral H26.9  Tinea pedis of both feet B35.3  Onychomycosis B35.1  Cigarette nicotine dependence without complication E45.012  
 MDD (major depressive disorder), recurrent episode, moderate ([facility]) F33.1  Dehydration E86.0  
 Post-menopausal Z78.0 Current medical providers:  Patient Care Team: 
Suad Shafer MD as PCP - General (Internal Medicine) Thiago Moody MD as Physician (Ophthalmology) PSH: Reviewed with patient Past Surgical History:  
Procedure Laterality Date [address] [facility] [place]-REMOVE INTRAUTERINE DEVICE  
  
 
SH: Reviewed with patient Social History Tobacco Use  Smoking status: Current Every Day Smoker Packs/day: 1.00 Years: 40.00 Pack years: 40.00 Types: Cigarettes Start date: 10/28/1967  Smokeless tobacco: Never Used Substance Use Topics  Alcohol use: Yes Comment: occasionally  Drug use: No  
 
 
FH: Reviewed with patient Family History Problem Relation Age of Onset  Breast Cancer Sister 79  Hypertension Mother  Stroke Father Medications/Allergies: Reviewed with patient Current Outpatient Medications on File Prior to Visit Medication Sig Dispense Refill  buPROPion SR (WELLBUTRIN SR) 150 mg SR tablet Take 1 Tab by mouth two (2) times a day for 180 days. last dose before 2PM 180 Tab 1  
 metFORMIN (GLUCOPHAGE) 500 mg tablet Take 1 Tab by mouth two (2) times daily (with meals). (Patient taking differently: Take 500 mg by mouth daily (with breakfast). ) 180 Tab 3  
 lisinopril-hydroCHLOROthiazide (PRINZIDE, ZESTORETIC) 10-12.5 mg per tablet TAKE 1 TABLET BY MOUTH EVERY DAY 90 Tab 3  
 BREO ELLIPTA 100-25 mcg/dose inhaler Take 1 Puff by inhalation daily. 3 Inhaler 3  
 atorvastatin (LIPITOR) 20 mg tablet TAKE ONE TABLET BY MOUTH EVERY NIGHT AT BEDTIME 90 Tab 3  
 montelukast (SINGULAIR) 10 mg tablet TAKE ONE TABLET BY MOUTH EVERY NIGHT AT BEDTIME 90 Tab 3  
 SPIRIVA WITH HANDIHALER 18 mcg inhalation capsule INHALE 1 CAPSULE VIA HANDIHALER ONCE DAILY AT THE SAME TIME EVERY DAY 90 Cap 3  
 albuterol (PROVENTIL HFA, VENTOLIN HFA, PROAIR HFA) 90 mcg/actuation inhaler Take 2 Puffs by inhalation every six (6) hours as needed for Wheezing or Shortness of Breath. 1 Inhaler 5  
 PAZEO 0.7 % drop INSTILL 1 DROPINTO BOTH EYES ONCE DAILY  99  
 ketoconazole (NIZORAL) 2 % topical cream Apply  to affected area two (2) times a day. INBETWEEN TOES AND BOTTOM OF BOTH FEET 15 g 1  
 alcohol swabs (ALCOHOL PADS) padm Use as needed 100 Pad 0  
 fluticasone (FLONASE ALLERGY RELIEF) 50 mcg/actuation nasal spray 2 Sprays by Both Nostrils route daily. 1 Bottle 6  Menthol-Zinc Oxide (CALMOSEPTINE) 0.44-20.6 % oint Apply  to affected area.  acetaminophen (TYLENOL) 650 mg CR tablet Take 650 mg by mouth every six (6) hours as needed for Pain.  ASPIRIN LOW-STRENGTH PO Take  by mouth. No current facility-administered medications on file prior to visit. Allergies Allergen Reactions  Penicillin G Unknown (comments) Objective: 
Visit Vitals /67 Pulse 81 Temp 98.4 °F (36.9 °C) (Oral) Resp 20 Ht 5' 4\" (1.626 m) Wt (!) 357 lb 12.8 oz (162.3 kg) SpO2 99% Comment: ra  
BMI 61.42 kg/m² Body mass index is 61.42 kg/m². Physical Exam: sitting comfortably, no acute distress Assessment of cognitive impairment: Alert and oriented x person, date, year, month, city, state, situation MMSE/MOCA: N/A Depression Screen: PHQ over the last two weeks 12/5/2018 PHQ Not Done Medical Reason (indicate in comments) Little interest or pleasure in doing things Several days Feeling down, depressed, irritable, or hopeless Several days Total Score PHQ 2 2 Fall Risk Assessment:   
Fall Risk Assessment, last 12 mths 12/5/2018 Able to walk? Yes Fall in past 12 months? No  
 
 
Functional Ability:  
Does the patient exhibit a steady gait? Yes with rollator walker How long did it take the patient to get up and walk from a sitting position? <4 seconds Is the patient self reliant?  (ie can do own laundry, meals, household chores)  yes Does the patient handle his/her own medications? yes Does the patient handle his/her own money? yes Is the patients home safe (ie good lighting, handrails on stairs and bath, etc.)? yes Did you notice or did patient express any hearing difficulties? no 
  
Did you notice or did patient express any vision difficulties? Yes, reading glasses Were distance and reading eye charts used? yes Advance Care Planning:  
Patient was offered the opportunity to discuss advance care planning:  yes Does patient have an Advance Directive:  no If no, did you provide information on [facility]ctions?  no  
 
Plan:   
 
Orders Placed This Encounter  PULMONARY FUNCTION TEST Health Maintenance Topic Date Due  Shingrix Vaccine Age 50> (1 of 2) 07/05/2002  LIPID PANEL Q1  05/04/2019  
 HEMOGLOBIN A1C Q6M  05/16/2019  
 FOOT EXAM Q1  06/15/2019  
 FOBT Q 1 YEAR AGE 50-75  06/22/2019  MICROALBUMIN Q1  08/02/2019  MEDICARE YEARLY EXAM  12/06/2019  BREAST CANCER SCRN MAMMOGRAM  05/10/2020  GLAUCOMA SCREENING Q2Y  05/18/2020  
 EYE EXAM RETINAL OR DILATED  05/18/2020  
 DTaP/Tdap/Td series (2 - Td) 03/06/2024  Hepatitis C Screening  Completed  Bone Densitometry (Dexa) Screening  Completed  Pneumococcal 65+ Low/Medium Risk  Completed  Influenza Age 5 to Adult  Completed *Patient verbalized understanding and agreement with the plan. A copy of the After Visit Summary with personalized health plan was given to the patient today.

## 2018-12-05 NOTE — PROGRESS NOTES
Chief Complaint Patient presents with  
 Nicotine Dependence Patient would like a PFT due at [facility]. 1. Have you been to the ER, urgent care clinic since your last visit? Hospitalized since your last visit? No 
 
2. Have you seen or consulted any other health care providers outside of the [address] [place] [address] since your last visit? Include any pap smears or colon screening.  No

## 2018-12-13 ENCOUNTER — TELEPHONE (OUTPATIENT)
Dept: FAMILY MEDICINE CLINIC | Age: 68
End: 2018-12-13

## 2018-12-13 DIAGNOSIS — Z12.2 ENCOUNTER FOR SCREENING FOR LUNG CANCER: ICD-10-CM

## 2018-12-13 DIAGNOSIS — F17.210 CIGARETTE NICOTINE DEPENDENCE WITHOUT COMPLICATION: Primary | ICD-10-CM

## 2018-12-13 NOTE — TELEPHONE ENCOUNTER
Called to discuss lung function test. No obstruction. Due for CT low dose lung cancer screening, order placed. Perfecto Mainland M.D.   [address], [address] [place], [place] [id number]  [facility]   [phone]

## 2018-12-31 ENCOUNTER — TELEPHONE (OUTPATIENT)
Dept: FAMILY MEDICINE CLINIC | Age: 68
End: 2018-12-31

## 2018-12-31 NOTE — TELEPHONE ENCOUNTER
Please let patient know that her recent low dose chest CT to screen for lung cancer was normal. Repeat in 1 year.    Thank you!!!

## 2018-12-31 NOTE — TELEPHONE ENCOUNTER
----- Message from Vanessa Meier, LPN sent at 72/22/6589 10:57 AM EST -----      ----- Message -----  From: Viji Whittaker Incoming Radiant Results  Sent: 12/21/2018  12:13 PM  To:  Kana Boles MD

## 2019-01-02 ENCOUNTER — TELEPHONE (OUTPATIENT)
Dept: FAMILY MEDICINE CLINIC | Age: 69
End: 2019-01-02

## 2019-01-02 NOTE — TELEPHONE ENCOUNTER
and name confirmed. Informed patient that CT scan was normal and to follow up in 1 year. Patient verbalized understanding of conversation.

## 2019-01-09 ENCOUNTER — TELEPHONE (OUTPATIENT)
Dept: FAMILY MEDICINE CLINIC | Age: 69
End: 2019-01-09

## 2019-01-09 NOTE — TELEPHONE ENCOUNTER
----- Message from Rafal Kearns LPN sent at 7/6/5689 12:45 PM EST -----      ----- Message -----  From: Celina Hewitt LPN  Sent: 38/61/6346  10:57 AM  To: Rafal Kearns LPN, #        ----- Message -----  From: [facility] Incoming Radiant Results  Sent: 12/21/2018  12:13 PM  To:  Sergo Lyman MD

## 2019-01-09 NOTE — TELEPHONE ENCOUNTER
Please let patient know that her low dose screening CT for lung cancer was negative. Rescreen in 1 year.

## 2019-01-10 ENCOUNTER — TELEPHONE (OUTPATIENT)
Dept: FAMILY MEDICINE CLINIC | Age: 69
End: 2019-01-10

## 2019-01-26 DIAGNOSIS — J44.9 COPD, MODERATE (HCC): ICD-10-CM

## 2019-02-01 RX ORDER — ALBUTEROL SULFATE 90 UG/1
AEROSOL, METERED RESPIRATORY (INHALATION)
Qty: 8.5 INHALER | Refills: 5 | Status: SHIPPED | OUTPATIENT
Start: 2019-02-01 | End: 2019-04-01 | Stop reason: SDUPTHER

## 2019-04-01 DIAGNOSIS — I10 ESSENTIAL HYPERTENSION, BENIGN: ICD-10-CM

## 2019-04-01 DIAGNOSIS — E11.9 CONTROLLED TYPE 2 DIABETES MELLITUS WITHOUT COMPLICATION, WITHOUT LONG-TERM CURRENT USE OF INSULIN (HCC): Primary | ICD-10-CM

## 2019-04-01 DIAGNOSIS — E78.2 MIXED HYPERLIPIDEMIA: ICD-10-CM

## 2019-04-01 DIAGNOSIS — F17.210 CIGARETTE NICOTINE DEPENDENCE WITHOUT COMPLICATION: ICD-10-CM

## 2019-04-01 DIAGNOSIS — J44.9 COPD, MODERATE (HCC): ICD-10-CM

## 2019-04-01 DIAGNOSIS — N39.41 URGE INCONTINENCE: ICD-10-CM

## 2019-04-01 DIAGNOSIS — B35.3 TINEA PEDIS OF BOTH FEET: ICD-10-CM

## 2019-04-01 DIAGNOSIS — J30.89 ALLERGIC RHINITIS DUE TO OTHER ALLERGIC TRIGGER, UNSPECIFIED SEASONALITY: ICD-10-CM

## 2019-04-01 RX ORDER — ATORVASTATIN CALCIUM 20 MG/1
TABLET, FILM COATED ORAL
Qty: 90 TAB | Refills: 3 | Status: SHIPPED | OUTPATIENT
Start: 2019-04-01 | End: 2019-11-05 | Stop reason: SDUPTHER

## 2019-04-01 RX ORDER — TIOTROPIUM BROMIDE 18 UG/1
CAPSULE ORAL; RESPIRATORY (INHALATION)
Qty: 90 CAP | Refills: 3 | Status: SHIPPED | OUTPATIENT
Start: 2019-04-01

## 2019-04-01 RX ORDER — FLUTICASONE FUROATE AND VILANTEROL TRIFENATATE 100; 25 UG/1; UG/1
1 POWDER RESPIRATORY (INHALATION) DAILY
Qty: 3 INHALER | Refills: 3 | Status: SHIPPED | OUTPATIENT
Start: 2019-04-01 | End: 2019-11-12 | Stop reason: SDUPTHER

## 2019-04-01 RX ORDER — BUPROPION HYDROCHLORIDE 150 MG/1
150 TABLET, EXTENDED RELEASE ORAL 2 TIMES DAILY
Qty: 180 TAB | Refills: 1 | Status: SHIPPED | OUTPATIENT
Start: 2019-04-01 | End: 2019-09-28

## 2019-04-01 RX ORDER — KETOCONAZOLE 20 MG/G
CREAM TOPICAL 2 TIMES DAILY
Qty: 15 G | Refills: 1 | Status: SHIPPED | OUTPATIENT
Start: 2019-04-01 | End: 2019-04-16 | Stop reason: SDUPTHER

## 2019-04-01 RX ORDER — OLOPATADINE HYDROCHLORIDE 7 MG/ML
SOLUTION OPHTHALMIC
Refills: 99 | Status: CANCELLED | OUTPATIENT
Start: 2019-04-01

## 2019-04-01 RX ORDER — FLUTICASONE PROPIONATE 50 MCG
2 SPRAY, SUSPENSION (ML) NASAL DAILY
Qty: 1 BOTTLE | Refills: 6 | Status: SHIPPED | OUTPATIENT
Start: 2019-04-01

## 2019-04-01 RX ORDER — GUAIFENESIN 100 MG/5ML
81 LIQUID (ML) ORAL DAILY
Qty: 90 TAB | Refills: 1 | Status: SHIPPED | OUTPATIENT
Start: 2019-04-01 | End: 2019-09-28

## 2019-04-01 RX ORDER — ALBUTEROL SULFATE 90 UG/1
2 AEROSOL, METERED RESPIRATORY (INHALATION)
Qty: 3 INHALER | Refills: 5 | Status: SHIPPED | OUTPATIENT
Start: 2019-04-01 | End: 2019-09-28

## 2019-04-01 RX ORDER — DEXTROMETHORPHAN HYDROBROMIDE, GUAIFENESIN 5; 100 MG/5ML; MG/5ML
650 LIQUID ORAL EVERY 8 HOURS
Qty: 90 TAB | Refills: 5 | Status: SHIPPED | OUTPATIENT
Start: 2019-04-01 | End: 2019-09-28

## 2019-04-01 RX ORDER — METFORMIN HYDROCHLORIDE 500 MG/1
500 TABLET ORAL
Qty: 90 TAB | Refills: 3 | Status: SHIPPED | OUTPATIENT
Start: 2019-04-01 | End: 2019-07-05

## 2019-04-01 RX ORDER — MENTHOL AND ZINC OXIDE .44; 20.625 G/100G; G/100G
OINTMENT TOPICAL
Qty: 113 G | Refills: 2 | Status: SHIPPED | OUTPATIENT
Start: 2019-04-01 | End: 2021-05-18

## 2019-04-01 RX ORDER — ISOPROPYL ALCOHOL 70 ML/100ML
SWAB TOPICAL
Qty: 100 PAD | Refills: 0 | Status: SHIPPED | OUTPATIENT
Start: 2019-04-01 | End: 2021-05-18

## 2019-04-01 RX ORDER — MONTELUKAST SODIUM 10 MG/1
TABLET ORAL
Qty: 90 TAB | Refills: 3 | Status: SHIPPED | OUTPATIENT
Start: 2019-04-01 | End: 2019-11-29 | Stop reason: SDUPTHER

## 2019-04-01 RX ORDER — LISINOPRIL AND HYDROCHLOROTHIAZIDE 10; 12.5 MG/1; MG/1
TABLET ORAL
Qty: 90 TAB | Refills: 3 | Status: SHIPPED | OUTPATIENT
Start: 2019-04-01 | End: 2019-06-18 | Stop reason: DRUGHIGH

## 2019-04-16 ENCOUNTER — TELEPHONE (OUTPATIENT)
Dept: FAMILY MEDICINE CLINIC | Age: 69
End: 2019-04-16

## 2019-04-16 DIAGNOSIS — B35.3 TINEA PEDIS OF BOTH FEET: ICD-10-CM

## 2019-04-16 RX ORDER — KETOCONAZOLE 20 MG/G
CREAM TOPICAL 2 TIMES DAILY
Qty: 120 G | Refills: 1 | Status: SHIPPED | OUTPATIENT
Start: 2019-04-16

## 2019-04-16 NOTE — TELEPHONE ENCOUNTER
\"Zamzam\" from [facility] called to let Dr Darcie Garcia know that Ketoconazole only comes in 120 gram tubes. Please call for verbal order asap @ [phone].

## 2019-06-18 ENCOUNTER — OFFICE VISIT (OUTPATIENT)
Dept: FAMILY MEDICINE CLINIC | Age: 69
End: 2019-06-18

## 2019-06-18 VITALS
WEIGHT: 293 LBS | TEMPERATURE: 97.9 F | HEIGHT: 64 IN | HEART RATE: 81 BPM | SYSTOLIC BLOOD PRESSURE: 96 MMHG | OXYGEN SATURATION: 95 % | RESPIRATION RATE: 18 BRPM | BODY MASS INDEX: 50.02 KG/M2 | DIASTOLIC BLOOD PRESSURE: 53 MMHG

## 2019-06-18 DIAGNOSIS — E11.9 CONTROLLED TYPE 2 DIABETES MELLITUS WITHOUT COMPLICATION, WITHOUT LONG-TERM CURRENT USE OF INSULIN (HCC): ICD-10-CM

## 2019-06-18 DIAGNOSIS — B35.3 TINEA PEDIS OF BOTH FEET: ICD-10-CM

## 2019-06-18 DIAGNOSIS — J44.9 COPD, MODERATE (HCC): ICD-10-CM

## 2019-06-18 DIAGNOSIS — F33.0 MDD (MAJOR DEPRESSIVE DISORDER), RECURRENT EPISODE, MILD (HCC): ICD-10-CM

## 2019-06-18 DIAGNOSIS — E78.2 MIXED HYPERLIPIDEMIA: ICD-10-CM

## 2019-06-18 DIAGNOSIS — Z99.89 OSA ON CPAP: ICD-10-CM

## 2019-06-18 DIAGNOSIS — L20.9 ATOPIC DERMATITIS, UNSPECIFIED TYPE: ICD-10-CM

## 2019-06-18 DIAGNOSIS — E66.01 CLASS 3 SEVERE OBESITY DUE TO EXCESS CALORIES WITH SERIOUS COMORBIDITY AND BODY MASS INDEX (BMI) OF 50.0 TO 59.9 IN ADULT (HCC): ICD-10-CM

## 2019-06-18 DIAGNOSIS — G47.33 OSA ON CPAP: ICD-10-CM

## 2019-06-18 DIAGNOSIS — I10 ESSENTIAL HYPERTENSION, BENIGN: Primary | ICD-10-CM

## 2019-06-18 RX ORDER — HYDROCORTISONE VALERATE 2 MG/G
OINTMENT TOPICAL 2 TIMES DAILY
Qty: 45 G | Refills: 2 | Status: SHIPPED | OUTPATIENT
Start: 2019-06-18

## 2019-06-18 RX ORDER — LISINOPRIL 10 MG/1
10 TABLET ORAL DAILY
Qty: 30 TAB | Refills: 2 | Status: SHIPPED | OUTPATIENT
Start: 2019-06-18 | End: 2019-07-19 | Stop reason: SDUPTHER

## 2019-06-18 NOTE — PROGRESS NOTES
Makayla Noble is a 79 y.o. female presents in office for    Chief Complaint   Patient presents with    Skin Discoloration     left arm and elbow; pt states \"this has been going on for months and it itches\"        Visit Vitals  BP 96/53   Pulse 81   Temp 97.9 °F (36.6 °C) (Oral)   Resp 18   Ht 5' 4\" (1.626 m)   Wt 349 lb (158.3 kg)   SpO2 95%   BMI 59.91 kg/m²         Health Maintenance Due   Topic Date Due    Shingrix Vaccine Age 50> (1 of 2) 07/05/2002    LIPID PANEL Q1  05/04/2019    HEMOGLOBIN A1C Q6M  05/16/2019    FOOT EXAM Q1  06/15/2019         1. Have you been to the ER, urgent care clinic since your last visit? Hospitalized since your last visit? No    2. Have you seen or consulted any other health care providers outside of the [address] [place] [address] since your last visit? Include any pap smears or colon screening. No     3 most recent PHQ Screens 6/18/2019   PHQ Not Done Active Diagnosis of Depression or Bipolar Disorder   Little interest or pleasure in doing things Not at all   Feeling down, depressed, irritable, or hopeless Not at all   Total Score PHQ 2 0     Abuse Screening Questionnaire 6/18/2019   Do you ever feel afraid of your partner? N   Are you in a relationship with someone who physically or mentally threatens you? N   Is it safe for you to go home? Y     Fall Risk Assessment, last 12 mths 6/18/2019   Able to walk? Yes   Fall in past 12 months?  No     Learning Assessment 5/4/2018   PRIMARY LEARNER Patient   BARRIERS PRIMARY LEARNER NONE   CO-LEARNER CAREGIVER No   PRIMARY LANGUAGE ENGLISH   LEARNER PREFERENCE PRIMARY OTHER (COMMENT)   ANSWERED BY patient    RELATIONSHIP SELF

## 2019-06-18 NOTE — PROGRESS NOTES
[address] [facility] INTERNAL MEDICINE NOTE    Chief Complaint   Patient presents with    Skin Discoloration     left arm and elbow; pt states \"this has been going on for months and it itches\"       HPI: Viridiana Gaston is a 79 y.o. female  who presents with the above CC(s). Skin Changes: left elbow darkening in color and itching and flaking for past few months. Sits predominantly during the day leaning on left elbow propped against chair. No pain, swelling, redness or warmth. No fever or chills. Anxiety and Depression: Emotionally \"okay\" has sister to support and daughter, using supports.  recently transitioned to nursing home and has dementia. Diabetes F/U: Diagnosed 2015. Medications include metformin 500 mg daily for past year. Medication compliance good. Working on low sugar diet. Exercise includes nothing currently. Denies nausea, vomiting, diarrhea, abdominal pain, unintentional weight loss, increased thirst, increased urination, recent skin infections or other infections. Due for foot exam today. Asthma/COPD: since last visit seen by pulmonologist, no change to medications    Low BP: Some LH dizziness. No passing out. No chest pain, new shortness of breath or palpitations. .   Past Medical Hx, Family Hx, Social Hx, Surgical Hx, Medications, Allergies: All reviewed and updated in EMR as appropriate. Current Outpatient Medications   Medication Sig    hydrocortisone valerate (WEST-GLENIS) 0.2 % ointment Apply  to affected area two (2) times a day. use thin layer to elbow twice a day for 2 weeks    lisinopril (PRINIVIL, ZESTRIL) 10 mg tablet Take 1 Tab by mouth daily for 90 days.  ketoconazole (NIZORAL) 2 % topical cream Apply  to affected area two (2) times a day. INBETWEEN TOES AND BOTTOM OF BOTH FEET    acetaminophen (TYLENOL) 650 mg TbER Take 1 Tab by mouth every eight (8) hours for 180 days.     alcohol swabs (ALCOHOL PADS) padm Use as needed    atorvastatin (LIPITOR) 20 mg tablet TAKE ONE TABLET BY MOUTH EVERY NIGHT AT BEDTIME    BREO ELLIPTA 100-25 mcg/dose inhaler Take 1 Puff by inhalation daily.  buPROPion SR (WELLBUTRIN SR) 150 mg SR tablet Take 1 Tab by mouth two (2) times a day for 180 days. last dose before 2PM    fluticasone propionate (FLONASE ALLERGY RELIEF) 50 mcg/actuation nasal spray 2 Sprays by Both Nostrils route daily.  menthol-zinc oxide (CALMOSEPTINE) 0.44-20.6 % oint Apply to affected area twice a day as needed    metFORMIN (GLUCOPHAGE) 500 mg tablet Take 1 Tab by mouth daily (with breakfast).  montelukast (SINGULAIR) 10 mg tablet TAKE ONE TABLET BY MOUTH EVERY NIGHT AT BEDTIME    albuterol (PROAIR HFA) 90 mcg/actuation inhaler Take 2 Puffs by inhalation every six (6) hours as needed for Wheezing for up to 180 days.  SPIRIVA WITH HANDIHALER 18 mcg inhalation capsule INHALE 1 CAPSULE VIA HANDIHALER ONCE DAILY AT THE SAME TIME EVERY DAY    aspirin (ASPIRIN LOW-STRENGTH) 81 mg chewable tablet Take 1 Tab by mouth daily for 180 days.  PAZEO 0.7 % drop INSTILL 1 DROPINTO BOTH EYES ONCE DAILY     No current facility-administered medications for this visit. OBJECTIVE:  Vitals:    06/18/19 1300   BP: 96/53   Pulse: 81   Resp: 18   Temp: 97.9 °F (36.6 °C)   TempSrc: Oral   SpO2: 95%   Weight: 349 lb (158.3 kg)   Height: 5' 4\" (1.626 m)       BP Readings from Last 3 Encounters:   06/18/19 96/53   12/05/18 129/67   11/16/18 125/74     Wt Readings from Last 3 Encounters:   06/18/19 349 lb (158.3 kg)   12/21/18 (!) 352 lb (159.7 kg)   12/05/18 (!) 357 lb 12.8 oz (162.3 kg)       General: Pleasant in no acute distress  HEENT:  Pupils equally round and reactive to light, extraocular muscle intact, conjunctiva clear, non-icterus. CVS: Heart regular, rate, and rhythm. Audible S1 and S2. No murmurs, rubs or gallops. PULM: Lungs clear to auscultation bilaterally. No wheezes, rales or rhonchi.    GI: Positive bowel sounds, soft, nondistended, non-tender. EXT: 2+ dorsalis pedis pulses bilaterally. No pedal edema bilaterally  Neuro: Alert and oriented x3. Gait arthralgic steady with cane. MSE: mood euthymic with underlying anxiety, affect congruent and reactive. No SI/HI. TP linear. Good insight and judgment. Cognition grossly intact, not formally assessed. SKIN: hyperkeratotic hyperpigmented skin on extensor surface of left forearm and elbow. Diabetic Foot exam: 2+ dorsalis pedis pulses bilaterally. Positive monofilament in all 10 toes and bottoms of both feet. Vibratory sense intact bilateral MTP joints. Joint poisition sense intact bilateral first digits. Skin negative for ulcerative lesions. Positive tinea pedis bilateral feet in between toes. Positive onychomycosis. LABS/IMAGIN/3/2018 10:43 AM - [facility]     Component Value Flag Ref Range Units Status   Microalbumin,urine random 1.60   0 - 3.0 MG/DL Final   Creatinine, urine 260.40  High   30 - 125 mg/dL Final   Microalbumin/Creat ratio (mg/g creat) 6   0 - 30 mg/g Final     2018  1:10 AM - [facility]     Component Value Flag Ref Range Units Status   Hemoglobin A1c 5.8  High   4.2 - 5.6 % Final       results reviewed by writer. Nursing Notes Reviewed    ASSESSMENT AND PLAN:    ICD-10-CM ICD-9-CM    1. Essential hypertension, benign N97 747.5 METABOLIC PANEL, COMPREHENSIVE      TSH 3RD GENERATION      lisinopril (PRINIVIL, ZESTRIL) 10 mg tablet  D/c lisinopril-HCTZ  Blood work in 1 week  F/u with new provider in 4 weeks   2. Atopic dermatitis, unspecified type L20.9 691.8 hydrocortisone valerate (WEST-GLENIS) 0.2 % ointment  Avoid pressure trauma to elbow   3. Controlled type 2 diabetes mellitus without complication, without long-term current use of insulin (HCC) E11.9 250.00 HEMOGLOBIN A1C WITH EAG  Continue metformin 500 mg daily, can consider d/c medicine of A1C <6.5      MICROALBUMIN, UR, RAND W/ MICROALB/CREAT RATIO   4.  DARCI on CPAP A77.97 756.40 METABOLIC PANEL, COMPREHENSIVE    Z99.89 V46.8 CBC WITH AUTOMATED DIFF   5. Mixed hyperlipidemia E78.2 272.2 LIPID PANEL   6. COPD, moderate (HCC) J44.9 496 Continue current mgmt  F/u with pulm   7. MDD (major depressive disorder), recurrent episode, mild (HCC) F33.0 296.31 Continue to monitor   8. Class 3 severe obesity due to excess calories with serious comorbidity and body mass index (BMI) of 50.0 to 59.9 in adult [facility]): IMPROVING E66.01 278.01 TSH 3RD GENERATION  ENCOURAGED TO CONT DIET AND EXERCISE     Z68.43 V85.43    9. Tinea pedis of both feet B35.3 110.4 Ketoconazole cream bid       No problem-specific Assessment & Plan notes found for this encounter. Orders Placed This Encounter    METABOLIC PANEL, COMPREHENSIVE    CBC WITH AUTOMATED DIFF    LIPID PANEL    HEMOGLOBIN A1C WITH EAG    MICROALBUMIN, UR, RAND W/ MICROALB/CREAT RATIO    TSH 3RD GENERATION    hydrocortisone valerate (WEST-GLENIS) 0.2 % ointment    lisinopril (PRINIVIL, ZESTRIL) 10 mg tablet       RTC in 4 weeks f/u low BP/HTN    After visit summary provided to patient    Assessment and plan above discussed with patient, patient voiced understanding and agreement with plan. More than 50% of this 45 MIN visit was spent face to face counseling the patient about the etiology and treatment options for the above health conditions outlined in assessment and plan    Florina Mcfarland M.D.   [address], [address] [place] [address], [address] [place] [address] [phone]  [place] [phone]  [phone]\

## 2019-06-18 NOTE — PATIENT INSTRUCTIONS
CONGRATULATIONS ON WEIGHT LOSS!!!!! 
 
IN 1 WEEK GET FASTING BLOOD WORK FROM [facility] OR [facility] 
 
FOR LOW BLOOD PRESSURE: 
STOP TAKING LISINOPRIL HYDROCHLOROTHIAZIDE  
START LISINOPRIL 10 MG DAILY, SENT TO PHARMACY ON FILE INCREASE WATER TO 1.5 TO 2 LITERS PER DAY 
GET BLOOD WORK, FASTING, NO FOOD FOR 8 HOURS, WATER AND MEDICINE OKAY IN 1 WEEK AFTER CHANGE IN BLOOD PRESSURE MEDICINE 
 
FOR FEET FUNGUS: 
START USING KETOCONAZOLE CREAM ON FEET IN BETWEEN TOES AND BOTTOM OF FEET TWICE A DAY FOR 6 WEEKS 
 
FOR ELBOW: 
START USING HYDROCORTISONE CREAM TWICE A DAY ON ELBOW FOR 2 WEEKS 
TRY TO STOP LEANING ON THE ELBOW DURING THE DAY RETURN IN 4 WEEKS TO FOLLOW UP BLOOD PRESSURE 
 
WILL LET YOU KNOW ABOUT WHETHER OR NOT WE NEED TO STOP METFORMIN AFTER I GET YOUR BLOOD TEST RESULTS 
 
 
 
FOR WEIGHT: 
Continue to work on low sugar/carbohydrate diet for weight loss Exercise 20 minutes walking a day 3-4 days week Learning About Low-Carbohydrate Diets for Weight Loss What is a low-carbohydrate diet? Low-carb diets avoid foods that are high in carbohydrate. These high-carb foods include pasta, bread, rice, cereal, fruits, and starchy vegetables. Instead, these diets usually have you eat foods that are high in fat and protein. Many people lose weight quickly on a low-carb diet. But the early weight loss is water. People on this diet often gain the weight back after they start eating carbs again. Not all diet plans are safe or work well. A lot of the evidence shows that low-carb diets aren't healthy. That's because these diets often don't include healthy foods like fruits and vegetables. Losing weight safely means balancing protein, fat, and carbs with every meal and snack. And low-carb diets don't always provide the vitamins, minerals, and fiber you need.  
If you have a serious medical condition, talk to your doctor before you try any diet. These conditions include kidney disease, heart disease, type 2 diabetes, high cholesterol, and high blood pressure. If you are pregnant, it may not be safe for your baby if you are on a low-carb diet. How can you lose weight safely? You might have heard that a diet plan helped another person lose weight. But that doesn't mean that it will work for you. It is very hard to stay on a diet that includes lots of big changes in your eating habits. If you want to get to a healthy weight and stay there, making healthy lifestyle changes will often work better than dieting. These steps can help. · Make a plan for change. Work with your doctor to create a plan that is right for you. · See a dietitian. He or she can show you how to make healthy changes in your eating habits. · Manage stress. If you have a lot of stress in your life, it can be hard to focus on making healthy changes to your daily habits. · Track your food and activity. You are likely to do better at losing weight if you keep track of what you eat and what you do. Follow-up care is a key part of your treatment and safety. Be sure to make and go to all appointments, and call your doctor if you are having problems. It's also a good idea to know your test results and keep a list of the medicines you take. Where can you learn more? Go to http://pooja-susana.info/. Enter A121 in the search box to learn more about \"Learning About Low-Carbohydrate Diets for Weight Loss. \" Current as of: December 8, 2016 Content Version: 11.3 © 4189-3301 Healthwise, Incorporated. Care instructions adapted under license by [employer] (which disclaims liability or warranty for this information). If you have questions about a medical condition or this instruction, always ask your healthcare professional. [address] any warranty or liability for your use of this information. Body Mass Index: Care Instructions Your Care Instructions Body mass index (BMI) can help you see if your weight is raising your risk for health problems. It uses a formula to compare how much you weigh with how tall you are. · A BMI lower than 18.5 is considered underweight. · A BMI between 18.5 and 24.9 is considered healthy. · A BMI between 25 and 29.9 is considered overweight. A BMI of 30 or higher is considered obese. If your BMI is in the normal range, it means that you have a lower risk for weight-related health problems. If your BMI is in the overweight or obese range, you may be at increased risk for weight-related health problems, such as high blood pressure, heart disease, stroke, arthritis or joint pain, and diabetes. If your BMI is in the underweight range, you may be at increased risk for health problems such as fatigue, lower protection (immunity) against illness, muscle loss, bone loss, hair loss, and hormone problems. BMI is just one measure of your risk for weight-related health problems. You may be at higher risk for health problems if you are not active, you eat an unhealthy diet, or you drink too much alcohol or use tobacco products. Follow-up care is a key part of your treatment and safety. Be sure to make and go to all appointments, and call your doctor if you are having problems. It's also a good idea to know your test results and keep a list of the medicines you take. How can you care for yourself at home? · Practice healthy eating habits. This includes eating plenty of fruits, vegetables, whole grains, lean protein, and low-fat dairy. · If your doctor recommends it, get more exercise. Walking is a good choice. Bit by bit, increase the amount you walk every day. Try for at least 30 minutes on most days of the week. · Do not smoke. Smoking can increase your risk for health problems.  If you need help quitting, talk to your doctor about stop-smoking programs and medicines. These can increase your chances of quitting for good. · Limit alcohol to 2 drinks a day for men and 1 drink a day for women. Too much alcohol can cause health problems. If you have a BMI higher than 25 · Your doctor may do other tests to check your risk for weight-related health problems. This may include measuring the distance around your waist. A waist measurement of more than 40 inches in men or 35 inches in women can increase the risk of weight-related health problems. · Talk with your doctor about steps you can take to stay healthy or improve your health. You may need to make lifestyle changes to lose weight and stay healthy, such as changing your diet and getting regular exercise. If you have a BMI lower than 18.5 · Your doctor may do other tests to check your risk for health problems. · Talk with your doctor about steps you can take to stay healthy or improve your health. You may need to make lifestyle changes to gain or maintain weight and stay healthy, such as getting more healthy foods in your diet and doing exercises to build muscle. Where can you learn more? Go to http://pooja-susana.info/. Enter S176 in the search box to learn more about \"Body Mass Index: Care Instructions. \" Current as of: October 13, 2016 Content Version: 11.4 © 6789-4034 Healthwise, Incorporated. Care instructions adapted under license by [employer] (which disclaims liability or warranty for this information). If you have questions about a medical condition or this instruction, always ask your healthcare professional. [address] any warranty or liability for your use of this information.

## 2019-06-19 DIAGNOSIS — B35.3 TINEA PEDIS OF BOTH FEET: ICD-10-CM

## 2019-06-20 RX ORDER — KETOCONAZOLE 20 MG/G
CREAM TOPICAL 2 TIMES DAILY
Qty: 15 G | Refills: 1 | Status: SHIPPED | OUTPATIENT
Start: 2019-06-20 | End: 2021-05-18

## 2019-07-05 ENCOUNTER — TELEPHONE (OUTPATIENT)
Dept: FAMILY MEDICINE CLINIC | Age: 69
End: 2019-07-05

## 2019-07-05 DIAGNOSIS — D53.9 MACROCYTIC ANEMIA: ICD-10-CM

## 2019-07-05 DIAGNOSIS — R77.0 ABNORMAL ALBUMIN: Primary | ICD-10-CM

## 2019-07-05 NOTE — TELEPHONE ENCOUNTER
Called patient, no answer, left VM for patient to return call to discuss lab results. LPN please call patient and inform lab results below:    Diabetes under great control. Stop metformin and have A1c rechecked in 3 months to ensure no need to restart. Anemia: mild. Start MVI with iron. Recheck levels in 3 months. If still low consider full work up. Low Albumin: Increase protein in diet. Plant based protein better than animal protein, fish second best.     Thyroid test normal.     Kana Felton M.D.   [address], [address] [place] [address], [address] [place] [address]   [facility]   [phone]

## 2019-07-19 DIAGNOSIS — I10 ESSENTIAL HYPERTENSION, BENIGN: ICD-10-CM

## 2019-08-04 RX ORDER — LISINOPRIL 10 MG/1
TABLET ORAL
Qty: 30 TAB | Refills: 1 | Status: SHIPPED | OUTPATIENT
Start: 2019-08-04 | End: 2019-10-30 | Stop reason: SDUPTHER

## 2019-08-05 NOTE — TELEPHONE ENCOUNTER
Left message for patient to call back. Need to inform patient to schedule establish care visit with new PCP for additional refills.

## 2019-08-09 DIAGNOSIS — F17.210 CIGARETTE NICOTINE DEPENDENCE WITHOUT COMPLICATION: ICD-10-CM

## 2019-09-11 RX ORDER — BUPROPION HYDROCHLORIDE 150 MG/1
TABLET, EXTENDED RELEASE ORAL
Qty: 180 TAB | Refills: 1 | Status: SHIPPED | OUTPATIENT
Start: 2019-09-11

## 2019-09-11 RX ORDER — CITALOPRAM 20 MG/1
TABLET, FILM COATED ORAL
Qty: 90 TAB | Refills: 3 | Status: SHIPPED | OUTPATIENT
Start: 2019-09-11 | End: 2019-10-30 | Stop reason: ALTCHOICE

## 2019-10-21 DIAGNOSIS — E78.2 MIXED HYPERLIPIDEMIA: ICD-10-CM

## 2019-10-30 ENCOUNTER — OFFICE VISIT (OUTPATIENT)
Dept: FAMILY MEDICINE CLINIC | Age: 69
End: 2019-10-30

## 2019-10-30 VITALS
OXYGEN SATURATION: 94 % | RESPIRATION RATE: 18 BRPM | SYSTOLIC BLOOD PRESSURE: 123 MMHG | DIASTOLIC BLOOD PRESSURE: 73 MMHG | HEIGHT: 64 IN | HEART RATE: 63 BPM | BODY MASS INDEX: 50.02 KG/M2 | TEMPERATURE: 97.3 F | WEIGHT: 293 LBS

## 2019-10-30 DIAGNOSIS — F33.0 MDD (MAJOR DEPRESSIVE DISORDER), RECURRENT EPISODE, MILD (HCC): ICD-10-CM

## 2019-10-30 DIAGNOSIS — Z12.31 ENCOUNTER FOR SCREENING MAMMOGRAM FOR BREAST CANCER: ICD-10-CM

## 2019-10-30 DIAGNOSIS — E11.9 CONTROLLED TYPE 2 DIABETES MELLITUS WITHOUT COMPLICATION, WITHOUT LONG-TERM CURRENT USE OF INSULIN (HCC): ICD-10-CM

## 2019-10-30 DIAGNOSIS — J44.9 COPD, MODERATE (HCC): ICD-10-CM

## 2019-10-30 DIAGNOSIS — R39.81 FUNCTIONAL URINARY INCONTINENCE: ICD-10-CM

## 2019-10-30 DIAGNOSIS — I10 ESSENTIAL HYPERTENSION, BENIGN: ICD-10-CM

## 2019-10-30 DIAGNOSIS — N32.81 OVERACTIVE BLADDER: ICD-10-CM

## 2019-10-30 DIAGNOSIS — D64.9 ANEMIA, UNSPECIFIED TYPE: ICD-10-CM

## 2019-10-30 DIAGNOSIS — Z23 ENCOUNTER FOR IMMUNIZATION: Primary | ICD-10-CM

## 2019-10-30 DIAGNOSIS — Z72.0 TOBACCO ABUSE: ICD-10-CM

## 2019-10-30 RX ORDER — BLOOD-GLUCOSE METER
EACH MISCELLANEOUS
Qty: 1 EACH | Refills: 0 | Status: SHIPPED | OUTPATIENT
Start: 2019-10-30 | End: 2021-05-18

## 2019-10-30 RX ORDER — LANCETS
EACH MISCELLANEOUS
Qty: 1 EACH | Refills: 11 | Status: SHIPPED | OUTPATIENT
Start: 2019-10-30 | End: 2021-05-18

## 2019-10-30 RX ORDER — LISINOPRIL 10 MG/1
TABLET ORAL
Qty: 90 TAB | Refills: 0 | Status: SHIPPED | OUTPATIENT
Start: 2019-10-30 | End: 2021-05-23

## 2019-10-30 RX ORDER — NICOTINE 7MG/24HR
1 PATCH, TRANSDERMAL 24 HOURS TRANSDERMAL EVERY 24 HOURS
Qty: 30 PATCH | Refills: 1 | Status: SHIPPED | OUTPATIENT
Start: 2019-10-30 | End: 2019-12-29

## 2019-10-30 RX ORDER — FLUOXETINE HYDROCHLORIDE 20 MG/1
20 CAPSULE ORAL DAILY
Qty: 30 CAP | Refills: 1 | Status: SHIPPED | OUTPATIENT
Start: 2019-10-30 | End: 2019-11-26 | Stop reason: SDUPTHER

## 2019-10-30 NOTE — PROGRESS NOTES
Chief Complaint   Patient presents with    Establish Care    Diabetes    Hypertension     1. Have you been to the ER, urgent care clinic since your last visit? Hospitalized since your last visit? No    2. Have you seen or consulted any other health care providers outside of the [address] [place] [address] since your last visit? Include any pap smears or colon screening. No     After obtaining consent, and per orders of [facility] DR JENNIFER NORWOOD, injection of Fluad 0.5mL given IM in right deltoid by Zane Urena LPN. Patient instructed to remain in clinic for 20 minutes afterwards, and to report any adverse reaction to me immediately.

## 2019-10-30 NOTE — PROGRESS NOTES
ESTABLISH CARE VISIT    SUBJECTIVE:     Chief Complaint   Patient presents with    Establish Care    Diabetes    Hypertension       HPI: 81 y.o.  female  has a past medical history of Back pain, CVA (cerebral vascular accident) ([facility] [address] (2015), Establishing care with new doctor, encounter for (04/20/2015), Hospital discharge follow-up (04/20/2015), Hypercholesterolemia with hypertriglyceridemia (12/02/2011), Hypertension, Impaired fasting glucose (04/20/2015), Knee pain, Osteoarthritis, and Urge incontinence (04/13/2016). is here for the above chief complaint(s). DM-2  Key Antihyperglycemic Medications     Patient is on no antihyperglycemic meds. Lab Results   Component Value Date/Time    Hemoglobin A1c 5.4 07/02/2019 01:54 PM       medication compliance: noncompliant much of the time, diabetic diet compliance: noncompliant much of the time, home glucose monitoring: is not performed    Hypertension  Patient is currently taking   Key CAD CHF Meds             lisinopril (PRINIVIL, ZESTRIL) 10 mg tablet (Taking) TAKE 1 TABLET BY MOUTH EVERY DAY    atorvastatin (LIPITOR) 20 mg tablet (Taking) TAKE ONE TABLET BY MOUTH EVERY NIGHT AT BEDTIME      . BP today is   BP Readings from Last 1 Encounters:   10/30/19 123/73     Patient has been taking medications as prescribed. Patient does not follow low salt diet. Patient is not currently exercising. Patient denies chest pain, shortness of breath, palpitations, lower extremity edema, dizziness/lightheadedness or syncopal episodes. Patient has not taken her medication yet today. Patient is taking atorvastatin daily.      Key CAD CHF Meds             lisinopril (PRINIVIL, ZESTRIL) 10 mg tablet (Taking) TAKE 1 TABLET BY MOUTH EVERY DAY    atorvastatin (LIPITOR) 20 mg tablet (Taking) TAKE ONE TABLET BY MOUTH EVERY NIGHT AT BEDTIME            Elevated Lipids    Key CAD CHF Meds             lisinopril (PRINIVIL, ZESTRIL) 10 mg tablet (Taking) TAKE 1 TABLET BY MOUTH EVERY DAY    atorvastatin (LIPITOR) 20 mg tablet (Taking) TAKE ONE TABLET BY MOUTH EVERY NIGHT AT BEDTIME          Lab Results   Component Value Date/Time    Cholesterol, total 133 (L) 07/02/2019 01:54 PM    HDL Cholesterol 75 07/02/2019 01:54 PM    LDL, calculated 37 07/02/2019 01:54 PM    VLDL, calculated 19.2 05/04/2018 11:53 AM    Triglyceride 107 07/02/2019 01:54 PM    CHOL/HDL Ratio 1.8 07/02/2019 01:54 PM         Obesity    Wt Readings from Last 5 Encounters:   10/30/19 (!) 354 lb (160.6 kg)   06/18/19 349 lb (158.3 kg)   12/21/18 (!) 352 lb (159.7 kg)   12/05/18 (!) 357 lb 12.8 oz (162.3 kg)   11/16/18 (!) 365 lb (165.6 kg)         Sleep apnea/COPD  Patient previously seeing pulmonologist, but they no longer take her insurance. Patient states that it has been 4-5 months since last visit. Patient is currently taking  Key COPD Medications             BREO ELLIPTA 100-25 mcg/dose inhaler (Taking) Take 1 Puff by inhalation daily. montelukast (SINGULAIR) 10 mg tablet (Taking) TAKE ONE TABLET BY MOUTH EVERY NIGHT AT BEDTIME    SPIRIVA WITH HANDIHALER 18 mcg inhalation capsule (Taking) INHALE 1 CAPSULE VIA HANDIHALER ONCE DAILY AT THE SAME TIME EVERY DAY        Patient does have shortness of breath with activity. Patient is compliant with medication. Tobacco abuse disorder  Currently smoking 4 cig/day. Very motivated to quit, has tried wellbutrin without any success. Health Maintenance:    Eye -  no complaints, last eye exam:  About 1 year since last visit. Hearing -  no complaints. U/A -  no UTI sxs. Frequency, incontinence with standing  Cardiac- denies history, denies chest pain. Pulmonary health/Smoking history- , denies cough, SOB.- Smoking - Current non-smoker.  Lung cancer screening: with low-dose computed tomography  (LDCT) in adults aged 54 to [de-identified] years who have a 30 pack-year smoking history and currently smoke or have quit within the past 15 yea  Testicular Exam - does self-exams, declines exam today. Mammo-  Due, will order  DEXA  - last year, due next year  Prostate - no prostate cancer in the family, patient is low risk, screen at 50   Colonoscopy - no colon cancer in the family, patient thinks she had this done 2 years ago, will request records      Review of Systems   Constitutional: Negative for chills, fever, malaise/fatigue and weight loss. Eyes: Negative for blurred vision, double vision and pain. Respiratory: Negative for cough, sputum production, shortness of breath and wheezing. Cardiovascular: Negative for chest pain, palpitations, orthopnea, claudication and leg swelling. Gastrointestinal: Negative for abdominal pain, constipation, diarrhea, nausea and vomiting. Genitourinary: Negative for dysuria, frequency and urgency. Neurological: Negative for dizziness, tingling and headaches. @[facility]@  Current Outpatient Medications   Medication Sig    buPROPion SR (WELLBUTRIN SR) 150 mg SR tablet TAKE 1 TAB BY MOUTH TWO (2) TIMES A DAY    lisinopril (PRINIVIL, ZESTRIL) 10 mg tablet TAKE 1 TABLET BY MOUTH EVERY DAY    hydrocortisone valerate (WEST-GLENIS) 0.2 % ointment Apply  to affected area two (2) times a day. use thin layer to elbow twice a day for 2 weeks    ketoconazole (NIZORAL) 2 % topical cream Apply  to affected area two (2) times a day. INBETWEEN TOES AND BOTTOM OF BOTH FEET    atorvastatin (LIPITOR) 20 mg tablet TAKE ONE TABLET BY MOUTH EVERY NIGHT AT BEDTIME    BREO ELLIPTA 100-25 mcg/dose inhaler Take 1 Puff by inhalation daily.  fluticasone propionate (FLONASE ALLERGY RELIEF) 50 mcg/actuation nasal spray 2 Sprays by Both Nostrils route daily.     menthol-zinc oxide (CALMOSEPTINE) 0.44-20.6 % oint Apply to affected area twice a day as needed    montelukast (SINGULAIR) 10 mg tablet TAKE ONE TABLET BY MOUTH EVERY NIGHT AT BEDTIME    SPIRIVA WITH HANDIHALER 18 mcg inhalation capsule INHALE 1 CAPSULE VIA HANDIHALER ONCE DAILY AT THE SAME TIME EVERY DAY    PAZEO 0.7 % drop INSTILL 1 DROPINTO BOTH EYES ONCE DAILY    citalopram (CELEXA) 20 mg tablet TAKE 1 TABLET BY MOUTH EVERY DAY    ketoconazole (NIZORAL) 2 % topical cream APPLY TO AFFECTED AREA TWO (2) TIMES A DAY. INBETWEEN TOES AND BOTTOM OF BOTH FEET    alcohol swabs (ALCOHOL PADS) padm Use as needed     No current facility-administered medications for this visit.       Health Maintenance   Topic Date Due    Shingrix Vaccine Age 49> (1 of 2) 07/05/2002    Influenza Age 5 to Adult  08/01/2019    MEDICARE YEARLY EXAM  12/06/2019    HEMOGLOBIN A1C Q6M  01/02/2020    FOBT Q 1 YEAR AGE 50-75  04/01/2020    BREAST CANCER SCRN MAMMOGRAM  05/10/2020    GLAUCOMA SCREENING Q2Y  05/18/2020    EYE EXAM RETINAL OR DILATED  05/18/2020    FOOT EXAM Q1  06/18/2020    MICROALBUMIN Q1  07/02/2020    LIPID PANEL Q1  07/02/2020    DTaP/Tdap/Td series (2 - Td) 03/06/2024    Hepatitis C Screening  Completed    Bone Densitometry (Dexa) Screening  Completed    Pneumococcal 65+ years  Completed       Medications and Allergies: Reviewed and confirmed in the chart    Past Medical Hx: Reviewed and confirmed in the chart  Past Medical History:   Diagnosis Date    Back pain     CVA [facility]cerebral vascular accident) ([facility] [address] 2015    Establishing care with new doctor, encounter for 04/20/2015   [facility] 04/20/2015    Hypercholesterolemia with hypertriglyceridemia 12/02/2011    Hypertension     Impaired fasting glucose 04/20/2015    Knee pain     Osteoarthritis     Urge incontinence 04/13/2016       Patient Active Problem List   Diagnosis Code    Low back pain M54.5    Knee pain M25.569    DJD (degenerative joint disease) M19.90    Mixed incontinence urge and stress N39.46    Allergic rhinitis J30.9    Controlled type 2 diabetes mellitus without complication, without long-term current use of insulin (HCC) E11.9    Moderate persistent asthma without complication C77.48    DDD (degenerative disc disease) [id number]    Essential hypertension, benign I10    Family history of breast cancer in sister Z80.2    Intertrigo L30.4    Mixed hyperlipidemia E78.2    DARCI on CPAP G47.33, Z99.89    Stasis, venous I87.8    Tear of meniscus of left knee S83.207A    Overactive bladder A80.52    Systolic murmur D17.7    History of CVA (cerebrovascular accident) Z80.78    Hypertensive retinopathy of both eyes H35.033    Cataracts, bilateral H26.9    Tinea pedis of both feet B35.3    Onychomycosis B35.1    Cigarette nicotine dependence without complication C05.800    MDD (major depressive disorder), recurrent episode, moderate (HCC) F33.1    Dehydration E86.0    Post-menopausal Z78.0    Class 3 severe obesity due to excess calories with serious comorbidity and body mass index (BMI) of 50.0 to 59.9 in adult (HCC) E66.01, Z68.43    MDD (major depressive disorder), recurrent episode, mild (HCC) F33.0    COPD, moderate (HCC) J44.9    Atopic dermatitis L20.9    Macrocytic anemia D53.9       Family Hx, Surgical Hx, Social Hx: Reviewed and updated in EMR    OBJECTIVE:  Vitals:    10/30/19 1304   BP: 123/73   Pulse: 63   Resp: 18   Temp: 97.3 °F (36.3 °C)   TempSrc: Oral   SpO2: 94%   Weight: (!) 354 lb (160.6 kg)   Height: 5' 4\" (1.626 m)       BP Readings from Last 3 Encounters:   10/30/19 123/73   06/18/19 96/53   12/05/18 129/67     Wt Readings from Last 3 Encounters:   10/30/19 (!) 354 lb (160.6 kg)   06/18/19 349 lb (158.3 kg)   12/21/18 (!) 352 lb (159.7 kg)       Physical Exam   Constitutional: She is oriented to person, place, and time and well-developed, well-nourished, and in no distress. No distress. Neck: Normal range of motion. Neck supple. No thyromegaly present. Cardiovascular: Normal rate, regular rhythm, normal heart sounds and intact distal pulses. Exam reveals no gallop and no friction rub. No murmur heard.   1+ bilateral LE edema, venous stasis     Pulmonary/Chest: Effort normal and breath sounds normal. No respiratory distress. She has no wheezes. She has no rales. She exhibits no tenderness. Lymphadenopathy:     She has no cervical adenopathy. Neurological: She is alert and oriented to person, place, and time. Skin: Skin is warm and dry. She is not diaphoretic. Psychiatric: Mood, memory, affect and judgment normal.   Vitals reviewed. Lab Results   Component Value Date/Time    WBC 8.2 07/02/2019 01:54 PM    HGB 11.4 (L) 07/02/2019 01:54 PM    HCT 36.6 (L) 07/02/2019 01:54 PM    PLATELET 221 45/94/5179 01:54 PM     Lab Results   Component Value Date/Time    Sodium 142 07/02/2019 01:54 PM    Potassium 3.5 07/02/2019 01:54 PM    Chloride 108 (H) 07/02/2019 01:54 PM    CO2 24 07/02/2019 01:54 PM    Glucose 88 07/02/2019 01:54 PM    BUN 14 07/02/2019 01:54 PM    Creatinine 0.9 07/02/2019 01:54 PM     Lab Results   Component Value Date/Time    Cholesterol, total 133 (L) 07/02/2019 01:54 PM    HDL Cholesterol 75 07/02/2019 01:54 PM    LDL, calculated 37 07/02/2019 01:54 PM    Triglyceride 107 07/02/2019 01:54 PM     No results found for: GPT    Nursing Notes Reviewed    ASSESSMENT AND PLAN  Diagnoses and all orders for this visit:    1. Encounter for immunization  -     INFLUENZA VACCINE INACTIVATED (IIV), SUBUNIT, ADJUVANTED, IM    2. Functional urinary incontinence  -     URINALYSIS W/ RFLX MICROSCOPIC; Future    3. Essential hypertension, benign  -     CBC WITH AUTOMATED DIFF; Future  - RF    lisinopril (PRINIVIL, ZESTRIL) 10 mg tablet; TAKE 1 TABLET BY MOUTH EVERY DAY  1) Goal blood pressure less than equal to 140/90 mmHg, goal BP can vary depending on risk factors as discussed. 2) Lifestyle modifications discussed with patient, low sodium <2 gm, low salt , DASH diet  3) Exercise for at least 30 min 3-5 times a week for goal BMI of less than or equal  To 25.  4) Continue current medications as prescribed.   5) Please begin medication as discussed for better blood pressure control, explained side effects and patient verbalized understanding. 6) Goal LDL<100.  7) Please monitor your blood pressure and keep a log to bring in with you at each visit. 8) Discussed risk factors with patient such as CAD, FAST of stroke symptoms, pt verbalizes understanding. 9) Please avoid smoking , alcohol and any illicit drug use if applicable to you. 10) Discussed lifestyle modifications ,dietary control and BP monitoring at home. 4. Controlled type 2 diabetes mellitus without complication, without long-term current use of insulin (HCC)  -     METABOLIC PANEL, COMPREHENSIVE; Future  -     HEMOGLOBIN A1C WITH EAG; Future  -     MICROALBUMIN, UR, RAND W/ MICROALB/CREAT RATIO; Future  -     Blood-Glucose Meter (ONETOUCH VERIO FLEX) misc; To be used for glucose monitoring twice a day. -     glucose blood VI test strips (ONETOUCH VERIO) strip; To be used to check glucose levels twice a day. -     lancets misc; To be used to check glucose levels twice a day. Will check a1c and decide management after labs. 1)   Goal HBA1C < 7.  2)   Post prandial blood sugar less than or equal to 180.  3)   Exercise 30 min 3-5 times a week for goal BMI of 25.  4)   Please monitor blood sugars as directed and keep a log to bring in with you at each visit. 5)   Diabetic diet discussed and patient instructions to be handed out. 6)   Goal LDL< 100  7)   Goal BP< 120/80 mmhg. 8)   Annual eye exam and foot exam.  9)   Please examine you feet daily for any skin breakages or ulcers. 10) Referral made to see nutritionist for better dietary guidance. 11) Continue current medications as prescribed. 12) Explained the side effects of medication and patient verbalized understanding. 13) Please avoid smoking , alcohol and illicit drug use if applicable to you.  14) Please have blood work ordered today completed. 15) Please make sure you schedule your routine medical exam every 1-2 years.       5. [facility]  -     REFERRAL TO PULMONARY DISEASE    6. Overactive bladder  -     URINALYSIS W/ RFLX MICROSCOPIC; Future    7. MDD (major depressive disorder), recurrent episode, mild (HCC)  -     FLUoxetine (PROZAC) 20 mg capsule; Take 1 Cap by mouth daily. Discussed with patient at length that depression is not well managed at this time. She took celexa for years with no relief. Will switch medication to prozac. 8. Anemia, unspecified type  Labs ordered today ,will manage at follow-up    9. Encounter for screening mammogram for breast cancer  -     [facility] MAMMO BI SCREENING INCL CAD; Future    10. Tobacco abuse  -     nicotine (NICODERM CQ) 7 mg/24 hr; 1 Patch by TransDERmal route every twenty-four (24) hours for 60 days. The patient was counseled on the dangers of tobacco use, and was advised to quit. Reviewed strategies to maximize success, including removing cigarettes and smoking materials from environment and pharmacotherapy (nicotine patch). Orders Placed This Encounter    Influenza Vaccine Inactivated (IIV) (FLUAD), Subunit, Adjuvanted, IM (25925)         I have discussed the diagnosis with the patient and the intended plan as seen in the above orders. The patient has received an after-visit summary and questions were answered concerning future plans. I have discussed medication side effects and warnings with the patient as well. I have reviewed the plan of care with the patient, accepted their input and they are in agreement with the treatment goals. More than 50% of this 30 min visit was spent counseling the patient face to face about etiology and treatment of health conditions outlined in assessment and plan        Carolann Toledo [address], [address] [place] [address]   [facility] -   [phone]

## 2019-11-04 ENCOUNTER — TELEPHONE (OUTPATIENT)
Dept: FAMILY MEDICINE CLINIC | Age: 69
End: 2019-11-04

## 2019-11-04 NOTE — TELEPHONE ENCOUNTER
Please let this patient know that she is supposed to get labs done and then we will determine how to better manage her diabetes after her labs completed. Carolann Naranjo PA-C  [facility]  [phone]  [address]

## 2019-11-05 RX ORDER — ATORVASTATIN CALCIUM 20 MG/1
TABLET, FILM COATED ORAL
Qty: 90 TAB | Refills: 3 | Status: SHIPPED | OUTPATIENT
Start: 2019-11-05

## 2019-11-12 DIAGNOSIS — J44.9 COPD, MODERATE (HCC): ICD-10-CM

## 2019-11-12 NOTE — TELEPHONE ENCOUNTER
Per fax,     Requested Prescriptions     Pending Prescriptions Disp Refills    BREO ELLIPTA 100-25 mcg/dose inhaler 3 Inhaler 3     Sig: Take 1 Puff by inhalation daily.

## 2019-11-13 RX ORDER — FLUTICASONE FUROATE AND VILANTEROL TRIFENATATE 100; 25 UG/1; UG/1
1 POWDER RESPIRATORY (INHALATION) DAILY
Qty: 3 INHALER | Refills: 3 | Status: SHIPPED | OUTPATIENT
Start: 2019-11-13 | End: 2020-11-12

## 2019-11-15 NOTE — TELEPHONE ENCOUNTER
Left another message for patient to call back. I am still trying to respond to patients first message.

## 2019-11-25 ENCOUNTER — TELEPHONE (OUTPATIENT)
Dept: FAMILY MEDICINE CLINIC | Age: 69
End: 2019-11-25

## 2019-11-25 NOTE — TELEPHONE ENCOUNTER
Pt called because she hasn't heard from provider about lab results from 2 wks ago. Please call pt back.

## 2019-11-25 NOTE — TELEPHONE ENCOUNTER
This patient was advised in October to come back for a follow-up apt in 1 month to review labs and to discuss her chronic health problems and switching her celexa to prozac. Her labs show that she has a low albumin level which can be from chronic lower extremity edema or from chronic kidney disease. Please advise. I would like to see her for a follow-up apt. Carolann Naranjo PA-C  [facility]  [phone]  [address]

## 2019-11-26 ENCOUNTER — TELEPHONE (OUTPATIENT)
Dept: FAMILY MEDICINE CLINIC | Age: 69
End: 2019-11-26

## 2019-11-26 DIAGNOSIS — F33.0 MDD (MAJOR DEPRESSIVE DISORDER), RECURRENT EPISODE, MILD (HCC): ICD-10-CM

## 2019-11-26 NOTE — TELEPHONE ENCOUNTER
Called patient to discuss with her that I wanted to see her in follow-up to discuss labs as well as change in anti-depressant. I advised her to make a follow-up apt (as discussed in October). Thank you. Carolann Naranjo PA-C  [facility]  [phone]  [address]

## 2019-11-28 RX ORDER — FLUOXETINE HYDROCHLORIDE 20 MG/1
20 CAPSULE ORAL DAILY
Qty: 30 CAP | Refills: 1 | Status: SHIPPED | OUTPATIENT
Start: 2019-11-28 | End: 2019-12-16 | Stop reason: DRUGHIGH

## 2019-11-29 DIAGNOSIS — J30.89 ALLERGIC RHINITIS DUE TO OTHER ALLERGIC TRIGGER, UNSPECIFIED SEASONALITY: ICD-10-CM

## 2019-12-03 RX ORDER — MONTELUKAST SODIUM 10 MG/1
TABLET ORAL
Qty: 90 TAB | Refills: 3 | Status: SHIPPED | OUTPATIENT
Start: 2019-12-03

## 2019-12-10 NOTE — TELEPHONE ENCOUNTER
Left another message for patient to call back. Need to inform patient of providers note. Routed to provider for review.

## 2019-12-13 NOTE — TELEPHONE ENCOUNTER
Will discuss with patient next week at follow-up apt. Carolann Naranjo PA-C  [facility]  [phone]  [address]

## 2019-12-16 ENCOUNTER — OFFICE VISIT (OUTPATIENT)
Dept: FAMILY MEDICINE CLINIC | Age: 69
End: 2019-12-16

## 2019-12-16 VITALS
BODY MASS INDEX: 50.02 KG/M2 | OXYGEN SATURATION: 97 % | TEMPERATURE: 97.2 F | RESPIRATION RATE: 18 BRPM | DIASTOLIC BLOOD PRESSURE: 83 MMHG | HEART RATE: 93 BPM | WEIGHT: 293 LBS | HEIGHT: 64 IN | SYSTOLIC BLOOD PRESSURE: 143 MMHG

## 2019-12-16 DIAGNOSIS — R39.81 FUNCTIONAL URINARY INCONTINENCE: ICD-10-CM

## 2019-12-16 DIAGNOSIS — F33.1 MDD (MAJOR DEPRESSIVE DISORDER), RECURRENT EPISODE, MODERATE (HCC): Primary | ICD-10-CM

## 2019-12-16 DIAGNOSIS — R53.82 CHRONIC FATIGUE: ICD-10-CM

## 2019-12-16 DIAGNOSIS — E11.21 TYPE 2 DIABETES WITH NEPHROPATHY (HCC): ICD-10-CM

## 2019-12-16 DIAGNOSIS — E11.9 CONTROLLED TYPE 2 DIABETES MELLITUS WITHOUT COMPLICATION, WITHOUT LONG-TERM CURRENT USE OF INSULIN (HCC): ICD-10-CM

## 2019-12-16 DIAGNOSIS — N32.81 OVERACTIVE BLADDER: ICD-10-CM

## 2019-12-16 RX ORDER — FLUOXETINE HYDROCHLORIDE 40 MG/1
40 CAPSULE ORAL DAILY
Qty: 30 CAP | Refills: 2 | Status: SHIPPED | OUTPATIENT
Start: 2019-12-16

## 2019-12-16 NOTE — PROGRESS NOTES
Follow Up Visit Note    Chief Complaint   Patient presents with        Hypertension       HPI:  Mitzy Allen is a 81 y.o.  female  has a past medical history of Back pain, CVA (cerebral vascular accident) ([facility] [address] (2015), Establishing care with new doctor, encounter for (04/20/2015), Hospital discharge follow-up (04/20/2015), Hypercholesterolemia with hypertriglyceridemia (12/02/2011), Hypertension, Impaired fasting glucose (04/20/2015), Knee pain, Osteoarthritis, and Urge incontinence (04/13/2016). is here for the above complaint(s). DM-2  Key Antihyperglycemic Medications     Patient is on no antihyperglycemic meds. Lab Results   Component Value Date/Time    Hemoglobin A1c 5.5 11/12/2019 12:19 PM     Diet-controlled diabetic. Hypertension  Patient is currently taking   Key CAD CHF Meds             atorvastatin (LIPITOR) 20 mg tablet (Taking) TAKE 1 TABLET BY MOUTH EVERYDAY AT BEDTIME    lisinopril (PRINIVIL, ZESTRIL) 10 mg tablet (Taking) TAKE 1 TABLET BY MOUTH EVERY DAY      . BP today is   BP Readings from Last 1 Encounters:   12/16/19 143/83     Patient has been taking medications as prescribed. Patient denies chest pain, shortness of breath, palpitations, lower extremity edema, dizziness/lightheadedness or syncopal episodes.       Key CAD CHF Meds             atorvastatin (LIPITOR) 20 mg tablet (Taking) TAKE 1 TABLET BY MOUTH EVERYDAY AT BEDTIME    lisinopril (PRINIVIL, ZESTRIL) 10 mg tablet (Taking) TAKE 1 TABLET BY MOUTH EVERY DAY            Elevated Lipids    Key CAD CHF Meds             atorvastatin (LIPITOR) 20 mg tablet (Taking) TAKE 1 TABLET BY MOUTH EVERYDAY AT BEDTIME    lisinopril (PRINIVIL, ZESTRIL) 10 mg tablet (Taking) TAKE 1 TABLET BY MOUTH EVERY DAY          Lab Results   Component Value Date/Time    Cholesterol, total 133 (L) 07/02/2019 01:54 PM    HDL Cholesterol 75 07/02/2019 01:54 PM    LDL, calculated 37 07/02/2019 01:54 PM    VLDL, calculated 19.2 05/04/2018 11:53 AM    Triglyceride 107 07/02/2019 01:54 PM    CHOL/HDL Ratio 1.8 07/02/2019 01:54 PM         Obesity    Wt Readings from Last 5 Encounters:   12/16/19 347 lb (157.4 kg)   10/30/19 (!) 354 lb (160.6 kg)   06/18/19 349 lb (158.3 kg)   12/21/18 (!) 352 lb (159.7 kg)   12/05/18 (!) 357 lb 12.8 oz (162.3 kg)     BP Readings from Last 3 Encounters:   12/16/19 143/83   10/30/19 123/73   06/18/19 96/53         Review of Systems   Constitutional: Negative for chills, fever, malaise/fatigue and weight loss. Eyes: Negative for blurred vision, double vision and pain. Respiratory: Negative for cough, sputum production, shortness of breath and wheezing. Cardiovascular: Negative for chest pain, palpitations, orthopnea, claudication and leg swelling. Gastrointestinal: Negative for abdominal pain, constipation, diarrhea, nausea and vomiting. Genitourinary: Negative for dysuria, frequency and urgency. Neurological: Negative for dizziness, tingling and headaches. Current Outpatient Medications   Medication Sig    montelukast (SINGULAIR) 10 mg tablet TAKE ONE TABLET BY MOUTH EVERY NIGHT AT BEDTIME    FLUoxetine (PROZAC) 20 mg capsule Take 1 Cap by mouth daily.  BREO ELLIPTA 100-25 mcg/dose inhaler Take 1 Puff by inhalation daily.  atorvastatin (LIPITOR) 20 mg tablet TAKE 1 TABLET BY MOUTH EVERYDAY AT BEDTIME    lisinopril (PRINIVIL, ZESTRIL) 10 mg tablet TAKE 1 TABLET BY MOUTH EVERY DAY    buPROPion SR (WELLBUTRIN SR) 150 mg SR tablet TAKE 1 TAB BY MOUTH TWO (2) TIMES A DAY    hydrocortisone valerate (WEST-GLENIS) 0.2 % ointment Apply  to affected area two (2) times a day. use thin layer to elbow twice a day for 2 weeks    ketoconazole (NIZORAL) 2 % topical cream Apply  to affected area two (2) times a day. INBETWEEN TOES AND BOTTOM OF BOTH FEET    fluticasone propionate (FLONASE ALLERGY RELIEF) 50 mcg/actuation nasal spray 2 Sprays by Both Nostrils route daily.     menthol-zinc oxide (CALMOSEPTINE) 0.44-20.6 % oint Apply to affected area twice a day as needed    SPIRIVA WITH HANDIHALER 18 mcg inhalation capsule INHALE 1 CAPSULE VIA HANDIHALER ONCE DAILY AT THE SAME TIME EVERY DAY    PAZEO 0.7 % drop INSTILL 1 DROPINTO BOTH EYES ONCE DAILY    Blood-Glucose Meter (ONETOUCH VERIO FLEX) misc To be used for glucose monitoring twice a day.  glucose blood VI test strips (ONETOUCH VERIO) strip To be used to check glucose levels twice a day.  lancets misc To be used to check glucose levels twice a day.  nicotine (NICODERM CQ) 7 mg/24 hr 1 Patch by TransDERmal route every twenty-four (24) hours for 60 days.  ketoconazole (NIZORAL) 2 % topical cream APPLY TO AFFECTED AREA TWO (2) TIMES A DAY. INBETWEEN TOES AND BOTTOM OF BOTH FEET    alcohol swabs (ALCOHOL PADS) padm Use as needed     No current facility-administered medications for this visit.       Health Maintenance   Topic Date Due    Shingrix Vaccine Age 49> (1 of 2) 07/05/2002    MEDICARE YEARLY EXAM  12/06/2019    FOBT Q 1 YEAR AGE 50-75  04/01/2020    BREAST CANCER SCRN MAMMOGRAM  05/10/2020    HEMOGLOBIN A1C Q6M  05/12/2020    GLAUCOMA SCREENING Q2Y  05/18/2020    EYE EXAM RETINAL OR DILATED  05/18/2020    FOOT EXAM Q1  06/18/2020    LIPID PANEL Q1  07/02/2020    MICROALBUMIN Q1  11/12/2020    DTaP/Tdap/Td series (2 - Td) 03/06/2024    Hepatitis C Screening  Completed    Bone Densitometry (Dexa) Screening  Completed    Influenza Age 5 to Adult  Completed    Pneumococcal 65+ years  Completed     Immunization History   Administered Date(s) Administered    Influenza Vaccine 10/04/2010, 12/02/2011, 12/20/2012, 10/03/2016, 10/17/2017    Influenza Vaccine (Tri) Adjuvanted 11/16/2018, 10/30/2019    Novel Influenza-H1N1-09, All Formulations 01/19/2010    Pneumococcal Conjugate (PCV-13) 10/17/2017    Pneumococcal Polysaccharide (PPSV-23) 09/14/2012, 11/16/2018    Tdap 03/06/2014       Allergies and Medications: Reviewed and updated in EMR. Past Medical History:   Diagnosis Date    Back pain     [facility]) 2015    Establishing care with new doctor, encounter for 04/20/2015   [facility] discharge follow[facility]up 04/20/2015    Hypercholesterolemia with hypertriglyceridemia 12/02/2011    Hypertension     Impaired fasting glucose 04/20/2015    Knee pain     Osteoarthritis     Urge incontinence 04/13/2016       Surgical History: Reviewed and updated in EMR as appropriate. Social History: Reviewed and updated in EMR as appropriate. Family History: Reviewed and updated in EMR as appropriate. OBJECTIVE:   Visit Vitals  /83   Pulse 93   Temp 97.2 °F (36.2 °C) (Oral)   Resp 18   Ht 5' 4\" (1.626 m)   Wt 347 lb (157.4 kg)   SpO2 97%   BMI 59.56 kg/m²        Physical Exam  Vitals signs reviewed. Constitutional:       General: She is not in acute distress. Appearance: She is not diaphoretic. Neck:      Musculoskeletal: Normal range of motion and neck supple. Thyroid: No thyromegaly. Cardiovascular:      Rate and Rhythm: Normal rate and regular rhythm. Heart sounds: Normal heart sounds. No murmur. No friction rub. No gallop. Pulmonary:      Effort: Pulmonary effort is normal. No respiratory distress. Breath sounds: Normal breath sounds. No wheezing or rales. Chest:      Chest wall: No tenderness. Lymphadenopathy:      Cervical: No cervical adenopathy. Skin:     General: Skin is warm and dry. Neurological:      Mental Status: She is alert and oriented to person, place, and time.    Psychiatric:         Mood and Affect: Mood and affect normal.         Cognition and Memory: Memory normal.         Judgment: Judgment normal.         LABS/RADIOLOGICAL TESTS:  Lab Results   Component Value Date/Time    WBC 8.2 07/02/2019 01:54 PM    HGB 11.4 (L) 07/02/2019 01:54 PM    HCT 36.6 (L) 07/02/2019 01:54 PM    PLATELET 543 55/02/3957 01:54 PM     Lab Results   Component Value Date/Time    Sodium 140 11/12/2019 12:19 PM    Potassium 3.8 11/12/2019 12:19 PM    Chloride 106 11/12/2019 12:19 PM    CO2 27 11/12/2019 12:19 PM    Glucose 102 11/12/2019 12:19 PM    BUN 13 11/12/2019 12:19 PM    Creatinine 1.0 11/12/2019 12:19 PM     Lab Results   Component Value Date/Time    Cholesterol, total 133 (L) 07/02/2019 01:54 PM    HDL Cholesterol 75 07/02/2019 01:54 PM    LDL, calculated 37 07/02/2019 01:54 PM    Triglyceride 107 07/02/2019 01:54 PM     No results found for: GPT  Lab Results   Component Value Date/Time    Hemoglobin A1c 5.5 11/12/2019 12:19 PM         All lab results and radiological studies were reviewed and discussed with the patient. ASSESSMENT/PLAN:    Diagnoses and all orders for this visit:    1. MDD (major depressive disorder), recurrent episode, moderate (HCC)  -     CBC WITH AUTOMATED DIFF; Future  -  Increase to   FLUoxetine (PROZAC) 40 mg capsule; Take 1 Cap by mouth daily. 2. Type 2 diabetes with nephropathy (HCC)  -     HEMOGLOBIN A1C WITH EAG; Future    3. Chronic fatigue  -     CBC WITH AUTOMATED DIFF; Future      Patient verbalized understanding and agreement with the plan. Patient was given an after-visit summary. Follow-up in 3 months or sooner if worsening symptoms. More than 50% of this 25 min visit was spent counseling the patient face to face about etiology and treatment of health conditions outlined in assessment and plan        Carolann Naranjo PA-C  [address], [address] [place], [place] [address] [id number]  [place] [phone]

## 2019-12-17 ENCOUNTER — TELEPHONE (OUTPATIENT)
Dept: FAMILY MEDICINE CLINIC | Age: 69
End: 2019-12-17

## 2019-12-17 DIAGNOSIS — Z12.2 SCREENING FOR MALIGNANT NEOPLASM OF RESPIRATORY ORGAN: Primary | ICD-10-CM

## 2019-12-17 NOTE — TELEPHONE ENCOUNTER
Aliya Loges w/ [facility] Screen stated pt is due for her annual screen.  Please use dx code F17.210 & fax to [phone]

## 2019-12-18 NOTE — TELEPHONE ENCOUNTER
Order placed. Carolann Naranjo PA-C  [address] [facility]  [phone]  [place], [address] [place] [address]

## 2021-05-01 NOTE — PATIENT INSTRUCTIONS
BRING COMPLETED ADVANCED CARE PLANNING FORM BACK NEXT VISIT 
  
For Smoking Cessation: CONGRATULATIONS ON SETTING  A QUIT DATE FOR January 1ST 2019. STOP WELLBUTRIN 
1 WEEK BEFORE QUIT DATE Get rid of all smoking material, lighters, jai trays, old boxes of cigarettes before quit date START WELLBUTRIN AGAIN 1 WEEK BEFORE QUIT DATE Enlist family and friend support Come up with alternative to smoking with you get cravings like deep breathing, exercise ball, chewing gum, sugarless hard candy IN STEAD OF SMOKING WHEN STRESS, CONSIDERING COUNTING TO 10 AND TAKING DEEP BREATHS IN THROUGH NOSE AND OUT THROUGH MOUTH, TAKING A WALK, USING STRESS BALLS OR CALLING A FRIEND FOR LUNGS: 
We are sending a referral to LUNG SPECIALIST. You should be called about this in 2-3weeks. If no word in 3 weeks please give us a call to follow up We are sending a referral to Papo Sanchez. You should be called about this in 1-2 weeks. If no word in 2 weeks please give us a call to follow up. FOR BLADDER SYMPTOMS: 
Stop vesicare Call UROLOGY to schedule with Jovita Prasad [address] [place] [address] Phone: [phone] Schedule of Personalized Health Plan The best way to stay healthy is to live a healthy lifestyle. A healthy lifestyle includes regular exercise, eating a well-balanced diet, keeping a healthy weight and not smoking. Regular physical exams and screening tests are another important way to take care of yourself. Preventive exams provided by health care providers can find health problems early when treatment works best and can keep you from getting certain diseases or illnesses. Preventive services include exams, lab tests, screenings, shots, monitoring and information to help you take care of your own health. All people over 65 should have a pneumonia shot.  Pneumonia shots are usually only needed once in a lifetime unless your doctor decides differently. All people over 65 should have a yearly flu shot. People over 65 are at medium to high risk for Hepatitis B. Three shots are needed for complete protection. In addition to your physical exam, some screening tests are recommended: 
 
Bone mass measurement (dexa scan) is recommended every two years Diabetes Mellitus screening is recommended every year. Glaucoma is an eye disease caused by high pressure in the eye. An eye exam is recommended every year. Cardiovascular screening tests that check your cholesterol and other blood fat (lipid) levels are recommended every five years. Colorectal Cancer screening tests help to find pre-cancerous polyps (growths in the colon) so they can be removed before they turn into cancer. Tests ordered for screening depend on your personal and family history risk factors. Screening for Breast Cancer is recommended yearly with a mammogram. 
 
Screening for Cervical Cancer is recommended every two years (annually for certain risk factors, such as previous history of STD or abnormal PAP in past 7 years), with a Pelvic Exam with PAP Here is a list of your current Health Maintenance items with a due date: 
Health Maintenance Topic Date Due  Shingrix Vaccine Age 50> (1 of 2) 07/05/2002  LIPID PANEL Q1  05/04/2019  
 HEMOGLOBIN A1C Q6M  05/16/2019  
 EYE EXAM RETINAL OR DILATED Q1  05/18/2019  
 FOOT EXAM Q1  06/15/2019  
 FOBT Q 1 YEAR AGE 50-75  06/22/2019  MICROALBUMIN Q1  08/02/2019  MEDICARE YEARLY EXAM  12/06/2019  BREAST CANCER SCRN MAMMOGRAM  05/10/2020  GLAUCOMA SCREENING Q2Y  05/18/2020  
 DTaP/Tdap/Td series (2 - Td) 03/06/2024  Hepatitis C Screening  Completed  Bone Densitometry (Dexa) Screening  Completed  Pneumococcal 65+ Low/Medium Risk  Completed  Influenza Age 5 to Adult  Completed  
 
 
 
 
  
 01-May-2021 16:09

## 2021-05-18 PROBLEM — I95.9 HYPOTENSION: Status: ACTIVE | Noted: 2021-05-18

## 2021-05-18 PROBLEM — I48.91 ATRIAL FIBRILLATION (HCC): Status: ACTIVE | Noted: 2021-05-18

## 2021-05-18 PROBLEM — D62 ACUTE BLOOD LOSS ANEMIA: Status: ACTIVE | Noted: 2021-05-18

## 2021-05-18 PROBLEM — K92.2 GI BLEED: Status: ACTIVE | Noted: 2021-05-18

## 2021-12-20 NOTE — TELEPHONE ENCOUNTER
Requested Prescriptions     Pending Prescriptions Disp Refills    montelukast (SINGULAIR) 10 mg tablet 90 Tab 3     Sig: TAKE ONE TABLET BY MOUTH EVERY NIGHT AT BEDTIME
[facility]

## 2022-11-10 ENCOUNTER — DOCUMENTATION ONLY (OUTPATIENT)
Dept: PULMONOLOGY | Age: 72
End: 2022-11-10

## 2022-11-10 NOTE — PROGRESS NOTES
Someone from Ref Dept at Dr. Michelle Thayer office called today re new patient ref. I do not see one in the chart. She stated that pt needed to be seen for pulmonary. I found the ref after I got off the phone with her, she hung up abruptly while I was trying to explain to her that I was going to check the sleep refs. It was put in w/MP Sleep ref. Pt was following with [facility]. She saw them in 2020. She also has had sleep studies done. We need those old records before we can set up new patient appt. Informed office those records were needed before she hung hung up. Will reach out to pt to see if she needs to be seen for pulm or sleep. Ref scanned under media.

## 2022-11-10 NOTE — PROGRESS NOTES
Called pt re new patient sleep ref. She states that both of our offices are too far from her. She needs something closer to her  home since she does not drive. Attempted to reach office back to let them know but I was directed to a voicemail. Left message informing them of pt's request. Will also fax ref back with notes.

## 2024-06-09 NOTE — TELEPHONE ENCOUNTER
Please let patient know that they need to make apt to establish care with a new provider. Last RF until patient comes in for apt. Carolann Naranjo PA-C  [employer]  [phone]  [place], [address] [place] [address] no